# Patient Record
Sex: FEMALE | Race: WHITE | Employment: OTHER | URBAN - METROPOLITAN AREA
[De-identification: names, ages, dates, MRNs, and addresses within clinical notes are randomized per-mention and may not be internally consistent; named-entity substitution may affect disease eponyms.]

---

## 2018-05-10 ENCOUNTER — OFFICE VISIT (OUTPATIENT)
Dept: FAMILY MEDICINE CLINIC | Age: 43
End: 2018-05-10
Payer: MEDICAID

## 2018-05-10 ENCOUNTER — HOSPITAL ENCOUNTER (OUTPATIENT)
Dept: GENERAL RADIOLOGY | Age: 43
Discharge: HOME | End: 2018-05-12
Payer: MEDICAID

## 2018-05-10 ENCOUNTER — TELEPHONE (OUTPATIENT)
Dept: FAMILY MEDICINE CLINIC | Age: 43
End: 2018-05-10

## 2018-05-10 ENCOUNTER — HOSPITAL ENCOUNTER (OUTPATIENT)
Age: 43
Discharge: HOME | End: 2018-05-12
Payer: MEDICAID

## 2018-05-10 VITALS
TEMPERATURE: 98.3 F | DIASTOLIC BLOOD PRESSURE: 76 MMHG | HEART RATE: 79 BPM | BODY MASS INDEX: 28 KG/M2 | RESPIRATION RATE: 16 BRPM | WEIGHT: 164 LBS | SYSTOLIC BLOOD PRESSURE: 108 MMHG | HEIGHT: 64 IN | OXYGEN SATURATION: 98 %

## 2018-05-10 DIAGNOSIS — R53.83 FATIGUE, UNSPECIFIED TYPE: Primary | ICD-10-CM

## 2018-05-10 DIAGNOSIS — E03.9 ACQUIRED HYPOTHYROIDISM: ICD-10-CM

## 2018-05-10 DIAGNOSIS — E55.9 VITAMIN D DEFICIENCY: ICD-10-CM

## 2018-05-10 DIAGNOSIS — L70.0 ACNE VULGARIS: ICD-10-CM

## 2018-05-10 DIAGNOSIS — M79.2 NEURALGIA: ICD-10-CM

## 2018-05-10 DIAGNOSIS — Z12.31 SCREENING MAMMOGRAM, ENCOUNTER FOR: ICD-10-CM

## 2018-05-10 DIAGNOSIS — E78.5 DYSLIPIDEMIA: ICD-10-CM

## 2018-05-10 DIAGNOSIS — F41.9 ANXIETY: ICD-10-CM

## 2018-05-10 DIAGNOSIS — R53.83 FATIGUE, UNSPECIFIED TYPE: ICD-10-CM

## 2018-05-10 DIAGNOSIS — Z72.89 OTHER PROBLEMS RELATED TO LIFESTYLE: ICD-10-CM

## 2018-05-10 LAB
BASOPHILS ABSOLUTE: 0.05 E9/L (ref 0–0.2)
BASOPHILS RELATIVE PERCENT: 0.7 % (ref 0–2)
EOSINOPHILS ABSOLUTE: 0.13 E9/L (ref 0.05–0.5)
EOSINOPHILS RELATIVE PERCENT: 1.7 % (ref 0–6)
HCT VFR BLD CALC: 38.7 % (ref 34–48)
HEMOGLOBIN: 13 G/DL (ref 11.5–15.5)
IMMATURE GRANULOCYTES #: 0.02 E9/L
IMMATURE GRANULOCYTES %: 0.3 % (ref 0–5)
LYMPHOCYTES ABSOLUTE: 1.9 E9/L (ref 1.5–4)
LYMPHOCYTES RELATIVE PERCENT: 25.6 % (ref 20–42)
MCH RBC QN AUTO: 33.2 PG (ref 26–35)
MCHC RBC AUTO-ENTMCNC: 33.6 % (ref 32–34.5)
MCV RBC AUTO: 99 FL (ref 80–99.9)
MONOCYTES ABSOLUTE: 0.57 E9/L (ref 0.1–0.95)
MONOCYTES RELATIVE PERCENT: 7.7 % (ref 2–12)
NEUTROPHILS ABSOLUTE: 4.76 E9/L (ref 1.8–7.3)
NEUTROPHILS RELATIVE PERCENT: 64 % (ref 43–80)
PDW BLD-RTO: 12.7 FL (ref 11.5–15)
PLATELET # BLD: 197 E9/L (ref 130–450)
PMV BLD AUTO: 11.1 FL (ref 7–12)
RBC # BLD: 3.91 E12/L (ref 3.5–5.5)
WBC # BLD: 7.4 E9/L (ref 4.5–11.5)

## 2018-05-10 PROCEDURE — 82306 VITAMIN D 25 HYDROXY: CPT

## 2018-05-10 PROCEDURE — 99214 OFFICE O/P EST MOD 30 MIN: CPT | Performed by: FAMILY MEDICINE

## 2018-05-10 PROCEDURE — 80061 LIPID PANEL: CPT

## 2018-05-10 PROCEDURE — 86703 HIV-1/HIV-2 1 RESULT ANTBDY: CPT

## 2018-05-10 PROCEDURE — 77063 BREAST TOMOSYNTHESIS BI: CPT

## 2018-05-10 PROCEDURE — 1036F TOBACCO NON-USER: CPT | Performed by: FAMILY MEDICINE

## 2018-05-10 PROCEDURE — 85025 COMPLETE CBC W/AUTO DIFF WBC: CPT

## 2018-05-10 PROCEDURE — G8417 CALC BMI ABV UP PARAM F/U: HCPCS | Performed by: FAMILY MEDICINE

## 2018-05-10 PROCEDURE — G8427 DOCREV CUR MEDS BY ELIG CLIN: HCPCS | Performed by: FAMILY MEDICINE

## 2018-05-10 PROCEDURE — 84443 ASSAY THYROID STIM HORMONE: CPT

## 2018-05-10 PROCEDURE — 80053 COMPREHEN METABOLIC PANEL: CPT

## 2018-05-10 RX ORDER — CLINDAMYCIN PHOSPHATE, BENZOYL PEROXIDE 25; 10 MG/G; MG/G
GEL TOPICAL
Qty: 30 G | Refills: 0 | Status: SHIPPED | OUTPATIENT
Start: 2018-05-10 | End: 2018-05-10 | Stop reason: CLARIF

## 2018-05-10 RX ORDER — CLINDAMYCIN AND BENZOYL PEROXIDE 10; 50 MG/G; MG/G
GEL TOPICAL
Qty: 30 G | Refills: 0 | Status: SHIPPED
Start: 2018-05-10 | End: 2020-07-05

## 2018-05-10 ASSESSMENT — ENCOUNTER SYMPTOMS
COUGH: 0
SORE THROAT: 0
SINUS PRESSURE: 0
DIARRHEA: 0
SHORTNESS OF BREATH: 0
EYE PAIN: 0
ABDOMINAL PAIN: 0
BACK PAIN: 0
CONSTIPATION: 0

## 2018-05-10 ASSESSMENT — PATIENT HEALTH QUESTIONNAIRE - PHQ9
2. FEELING DOWN, DEPRESSED OR HOPELESS: 0
SUM OF ALL RESPONSES TO PHQ QUESTIONS 1-9: 1
SUM OF ALL RESPONSES TO PHQ9 QUESTIONS 1 & 2: 1
1. LITTLE INTEREST OR PLEASURE IN DOING THINGS: 1

## 2018-05-11 LAB
ALBUMIN SERPL-MCNC: 4.2 G/DL (ref 3.5–5.2)
ALP BLD-CCNC: 42 U/L (ref 35–104)
ALT SERPL-CCNC: 13 U/L (ref 0–32)
ANION GAP SERPL CALCULATED.3IONS-SCNC: 13 MMOL/L (ref 7–16)
AST SERPL-CCNC: 19 U/L (ref 0–31)
BILIRUB SERPL-MCNC: 0.6 MG/DL (ref 0–1.2)
BUN BLDV-MCNC: 14 MG/DL (ref 6–20)
CALCIUM SERPL-MCNC: 9 MG/DL (ref 8.6–10.2)
CHLORIDE BLD-SCNC: 105 MMOL/L (ref 98–107)
CHOLESTEROL, TOTAL: 175 MG/DL (ref 0–199)
CO2: 22 MMOL/L (ref 22–29)
CREAT SERPL-MCNC: 0.7 MG/DL (ref 0.5–1)
GFR AFRICAN AMERICAN: >60
GFR NON-AFRICAN AMERICAN: >60 ML/MIN/1.73
GLUCOSE BLD-MCNC: 85 MG/DL (ref 74–109)
HDLC SERPL-MCNC: 55 MG/DL
HIV-1 AND HIV-2 ANTIBODIES: NORMAL
LDL CHOLESTEROL CALCULATED: 99 MG/DL (ref 0–99)
POTASSIUM SERPL-SCNC: 4.3 MMOL/L (ref 3.5–5)
SODIUM BLD-SCNC: 140 MMOL/L (ref 132–146)
TOTAL PROTEIN: 6.9 G/DL (ref 6.4–8.3)
TRIGL SERPL-MCNC: 103 MG/DL (ref 0–149)
TSH SERPL DL<=0.05 MIU/L-ACNC: 1.25 UIU/ML (ref 0.27–4.2)
VITAMIN D 25-HYDROXY: 30 NG/ML (ref 30–100)
VLDLC SERPL CALC-MCNC: 21 MG/DL

## 2018-05-16 ENCOUNTER — TELEPHONE (OUTPATIENT)
Dept: FAMILY MEDICINE CLINIC | Age: 43
End: 2018-05-16

## 2018-05-24 ENCOUNTER — PATIENT MESSAGE (OUTPATIENT)
Dept: FAMILY MEDICINE CLINIC | Age: 43
End: 2018-05-24

## 2018-05-29 ENCOUNTER — TELEPHONE (OUTPATIENT)
Dept: FAMILY MEDICINE CLINIC | Age: 43
End: 2018-05-29

## 2018-05-29 RX ORDER — GABAPENTIN 300 MG/1
600 CAPSULE ORAL 2 TIMES DAILY
Qty: 120 CAPSULE | Refills: 3 | Status: SHIPPED | OUTPATIENT
Start: 2018-05-29 | End: 2019-03-04

## 2018-08-02 ENCOUNTER — PATIENT MESSAGE (OUTPATIENT)
Dept: FAMILY MEDICINE CLINIC | Age: 43
End: 2018-08-02

## 2018-08-03 RX ORDER — FLUCONAZOLE 150 MG/1
150 TABLET ORAL ONCE
Qty: 2 TABLET | Refills: 0 | Status: SHIPPED | OUTPATIENT
Start: 2018-08-03 | End: 2018-08-03

## 2018-08-09 ENCOUNTER — HOSPITAL ENCOUNTER (OUTPATIENT)
Dept: SLEEP CENTER | Age: 43
Discharge: HOME | End: 2018-08-09
Payer: MEDICAID

## 2018-08-09 VITALS
DIASTOLIC BLOOD PRESSURE: 68 MMHG | RESPIRATION RATE: 16 BRPM | BODY MASS INDEX: 27.31 KG/M2 | WEIGHT: 160 LBS | SYSTOLIC BLOOD PRESSURE: 104 MMHG | HEART RATE: 61 BPM | OXYGEN SATURATION: 98 % | HEIGHT: 64 IN

## 2018-08-09 PROCEDURE — 95810 POLYSOM 6/> YRS 4/> PARAM: CPT

## 2018-08-09 ASSESSMENT — SLEEP AND FATIGUE QUESTIONNAIRES
HOW LIKELY ARE YOU TO NOD OFF OR FALL ASLEEP IN A CAR, WHILE STOPPED FOR A FEW MINUTES IN TRAFFIC: 0
HOW LIKELY ARE YOU TO NOD OFF OR FALL ASLEEP WHILE WATCHING TV: 1
HOW LIKELY ARE YOU TO NOD OFF OR FALL ASLEEP WHILE SITTING QUIETLY AFTER LUNCH WITHOUT ALCOHOL: 3
HOW LIKELY ARE YOU TO NOD OFF OR FALL ASLEEP WHILE LYING DOWN TO REST IN THE AFTERNOON WHEN CIRCUMSTANCES PERMIT: 3
HOW LIKELY ARE YOU TO NOD OFF OR FALL ASLEEP WHILE SITTING AND READING: 2
HOW LIKELY ARE YOU TO NOD OFF OR FALL ASLEEP WHEN YOU ARE A PASSENGER IN A CAR FOR AN HOUR WITHOUT A BREAK: 3
HOW LIKELY ARE YOU TO NOD OFF OR FALL ASLEEP WHILE SITTING INACTIVE IN A PUBLIC PLACE: 1
ESS TOTAL SCORE: 15
HOW LIKELY ARE YOU TO NOD OFF OR FALL ASLEEP WHILE SITTING AND TALKING TO SOMEONE: 2

## 2018-08-12 ENCOUNTER — PATIENT MESSAGE (OUTPATIENT)
Dept: FAMILY MEDICINE CLINIC | Age: 43
End: 2018-08-12

## 2018-08-12 DIAGNOSIS — E03.9 ACQUIRED HYPOTHYROIDISM: ICD-10-CM

## 2018-08-13 NOTE — PROGRESS NOTES
[address] [address]                                SLEEP STUDY REPORT    PATIENT NAME: Karin Bailey                     :        1975  MED REC NO:   [id number]                            ROOM:  ACCOUNT NO:   [de-identified]                           ADMIT DATE: 2018  PROVIDER:     Hussein Mendoza MD    DATE OF STUDY:  2018    POLYSOMNOGRAPHY    A patient of Dr. Elsie Blanco. INDICATION FOR POLYSOMNOGRAPHY:  Snoring, restless sleep, morning  headaches, trouble with concentration, frequent waking to urinate. CURRENT MEDICATIONS:  Paroxetine, levothyroxine. INTERPRETATION/FINDINGS:  SLEEP SUMMARY:  This patient had a total time in bed of 376 minutes. Total  sleep time was 364 minutes. Sleep efficiency was 97%. Sleep latency was  less than 10 minutes. REM latency was 156 minutes. During sleep, there  were 29 arousals and 4 awakenings. The sleep disruption index was normal.    SLEEP STAGING:  The patient was awake less than 1% of the sleep time. Stage N1 was 6% and N2 was 67% of the sleep time. Slow-wave sleep was 9%  of the sleep time and stage REM sleep was 18% of the sleep time. RESPIRATION SUMMARY:  APNEA:  There were no apneic events. HYPOPNEA:  There were 20 hypopneic events. Eleven occurred during REM  stage sleep and two occurred in the non-supine position. Maximum duration  of a hypopneic event was 50 seconds and lowest oxygen saturation was 84%. APNEA/HYPOPNEA INDEX:  The apnea/hypopnea index is 3. OXYGEN SATURATION:  Baseline oxygen saturation was 93%. Lowest saturation  was 84%. The patient spent less than 2% of the time with saturation less  than 90%. EKG SUMMARY:  Baseline heart rate was 73 beats per minute. Average heart  rate was 78 beats per minute. Lowest heart rate was 60 beats per minute. There were no ectopic beats.     MISCELLANEOUS:  [place] Sleepiness Scale score was 18/24. Snoring was  moderate. This was graded as 5 on a 1 to 10 scale. There was occasional  bruxism. Leg jerks occurred independent of respiratory events. The limb  movement index was normal.    IMPRESSION:  1. No evidence of sleep apnea syndrome. 2.  Hypersomnolence indicated by a high sleep efficiency and a short sleep  latency. 3.  Moderate snoring. 4.  No cardiac dysrhythmia. 5.  Good oxygen saturation. DISCUSSION:  This patient has an apnea/hypopnea index of 3. Normal is less  than 5, leaving this patient in the normal category. Along with this,  there was good oxygen saturation and no cardiac dysrhythmia. There was  moderate snoring, however. Based on the results of this study, the patient  does not require treatment for sleep apnea syndrome. However, it should be  noted that one of the patient's complaints is vivid dreams and there is  certainly hypersomnolence noted by an increased sleep efficiency and a  short sleep latency. Also hypersomnolence is suggested by the patient's very high  [place] [address] Sleepiness Scale score. Therefore, the patient will be seen in the  office to discuss the results of this study and determine if further  evaluation/treatment is indicated. PLAN:  1. No treatment at this time. 2.  The patient to be seen in the office to discuss results of this study  and determine if further evaluation/treatment is indicated.         Opal Liriano MD  Diplomat of Sleep Medicine    D: 08/12/2018 12:16:32       T: 08/12/2018 12:18:36     [email or web address]  Job#: [id number]     Doc#: [id number]    CC:

## 2018-08-14 RX ORDER — LEVOTHYROXINE SODIUM 0.05 MG/1
TABLET ORAL
Qty: 90 TABLET | Refills: 1 | Status: SHIPPED | OUTPATIENT
Start: 2018-08-14 | End: 2019-01-31 | Stop reason: SDUPTHER

## 2018-08-17 DIAGNOSIS — G47.30 SLEEP APNEA, UNSPECIFIED TYPE: ICD-10-CM

## 2018-09-07 ENCOUNTER — HOSPITAL ENCOUNTER (OUTPATIENT)
Age: 43
Discharge: HOME | End: 2018-09-09
Payer: MEDICAID

## 2018-09-07 ENCOUNTER — OFFICE VISIT (OUTPATIENT)
Dept: FAMILY MEDICINE CLINIC | Age: 43
End: 2018-09-07
Payer: MEDICAID

## 2018-09-07 VITALS
RESPIRATION RATE: 16 BRPM | SYSTOLIC BLOOD PRESSURE: 133 MMHG | TEMPERATURE: 98.4 F | HEIGHT: 64 IN | OXYGEN SATURATION: 98 % | BODY MASS INDEX: 29.53 KG/M2 | HEART RATE: 78 BPM | WEIGHT: 173 LBS | DIASTOLIC BLOOD PRESSURE: 89 MMHG

## 2018-09-07 DIAGNOSIS — J02.9 SORE THROAT: ICD-10-CM

## 2018-09-07 DIAGNOSIS — J03.90 TONSILLITIS: Primary | ICD-10-CM

## 2018-09-07 DIAGNOSIS — Z23 NEED FOR INFLUENZA VACCINATION: ICD-10-CM

## 2018-09-07 PROCEDURE — 90471 IMMUNIZATION ADMIN: CPT | Performed by: FAMILY MEDICINE

## 2018-09-07 PROCEDURE — G8427 DOCREV CUR MEDS BY ELIG CLIN: HCPCS | Performed by: FAMILY MEDICINE

## 2018-09-07 PROCEDURE — 90688 IIV4 VACCINE SPLT 0.5 ML IM: CPT | Performed by: FAMILY MEDICINE

## 2018-09-07 PROCEDURE — 87081 CULTURE SCREEN ONLY: CPT

## 2018-09-07 PROCEDURE — 1036F TOBACCO NON-USER: CPT | Performed by: FAMILY MEDICINE

## 2018-09-07 PROCEDURE — 99213 OFFICE O/P EST LOW 20 MIN: CPT | Performed by: FAMILY MEDICINE

## 2018-09-07 PROCEDURE — 87147 CULTURE TYPE IMMUNOLOGIC: CPT

## 2018-09-07 PROCEDURE — G8417 CALC BMI ABV UP PARAM F/U: HCPCS | Performed by: FAMILY MEDICINE

## 2018-09-07 RX ORDER — METHYLPREDNISOLONE 4 MG/1
TABLET ORAL
Qty: 1 KIT | Refills: 0 | Status: SHIPPED | OUTPATIENT
Start: 2018-09-07 | End: 2018-09-13

## 2018-09-07 RX ORDER — AMOXICILLIN AND CLAVULANATE POTASSIUM 875; 125 MG/1; MG/1
1 TABLET, FILM COATED ORAL 2 TIMES DAILY WITH MEALS
Qty: 20 TABLET | Refills: 0 | Status: SHIPPED | OUTPATIENT
Start: 2018-09-07 | End: 2018-09-17

## 2018-09-07 ASSESSMENT — ENCOUNTER SYMPTOMS
SHORTNESS OF BREATH: 0
SORE THROAT: 1
BACK PAIN: 0
ABDOMINAL PAIN: 0
EYE PAIN: 0
COUGH: 0
DIARRHEA: 0
CONSTIPATION: 0
SINUS PRESSURE: 0

## 2018-09-07 NOTE — PROGRESS NOTES
Chinmay De La Paz  : 1975    Chief Complaint:     Chief Complaint   Patient presents with    Pharyngitis    Immunizations     flu shot       HPI  Chinmay De La Paz 43 y.o. presents for   Chief Complaint   Patient presents with    Pharyngitis    Immunizations     flu shot     Patient complaint a sore throat for the past 2 weeks. She denies any sinus pressure pain, ear pressure pain. She has been taking Flonase with little relief. She states the sore throat is now more on the top of her mouth. She denies any fevers or chills, cough. She does admit to some postnasal drip. All questions were answered to patients satisfaction. Past Medical History:   Diagnosis Date    Acne     Anxiety     Dyslipidemia     History of tobacco use     Hypothyroidism     hx  no tx at present    Insomnia     Low back pain, episodic     Outbursts of anger        Past Surgical History:   Procedure Laterality Date     SECTION  2008     SECTION  2012    TUBAL LIGATION  2012       Social History     Social History    Marital status:      Spouse name: N/A    Number of children: N/A    Years of education: N/A     Social History Main Topics    Smoking status: Former Smoker     Packs/day: 0.25     Years: 5.00     Quit date: 2009    Smokeless tobacco: Never Used      Comment: I smoked for 12 years then quit, then smoked occasionally until I quit 2009    Alcohol use Yes     0 Standard drinks or equivalent per week      Comment: very rarely, 1 drink maybe one or twice a month    Drug use: No    Sexual activity: Yes     Partners: Male     Other Topics Concern    None     Social History Narrative    None       No family history on file. Current Outpatient Prescriptions   Medication Sig Dispense Refill    methylPREDNISolone (MEDROL DOSEPACK) 4 MG tablet Take by mouth.  1 kit 0    amoxicillin-clavulanate (AUGMENTIN) 875-125 MG per tablet Take 1 tablet by mouth 2 times daily (with meals) for 10 days 20 tablet 0    levothyroxine (SYNTHROID) 50 MCG tablet TAKE 1 TABLET DAILY 90 tablet 1    gabapentin (NEURONTIN) 300 MG capsule Take 2 capsules by mouth 2 times daily for 31 days. . 120 capsule 3    clindamycin-benzoyl peroxide (BENZACLIN) 1-5 % gel Apply topically 2 times daily. 30 g 0    PARoxetine (PAXIL) 10 MG tablet Take 1 tablet by mouth every morning 90 tablet 1    Omega-3 Fatty Acids (FISH OIL) 1200 MG CAPS Take by mouth      GINSENG PO Take by mouth      Ginkgo Biloba (GINKOBA PO) Take by mouth      tretinoin (RETIN-A) 0.025 % cream       Multiple Vitamin (MULTI-VITAMIN PO) Take  by mouth. No current facility-administered medications for this visit. No Known Allergies    There are no preventive care reminders to display for this patient. REVIEW OF SYSTEMS  Review of Systems   Constitutional: Negative for fatigue and fever. HENT: Positive for postnasal drip and sore throat. Negative for ear pain, sinus pressure and sneezing. Eyes: Negative for pain. Respiratory: Negative for cough and shortness of breath. Cardiovascular: Negative for chest pain and leg swelling. Gastrointestinal: Negative for abdominal pain, constipation and diarrhea. Genitourinary: Negative for dysuria and urgency. Musculoskeletal: Negative for back pain and myalgias. Skin: Negative for rash. Allergic/Immunologic: Negative for food allergies. Neurological: Negative for light-headedness and headaches. Hematological: Does not bruise/bleed easily. Psychiatric/Behavioral: Negative for behavioral problems and sleep disturbance. PHYSICAL EXAM  /89 (Site: Right Upper Arm)   Pulse 78   Temp 98.4 °F (36.9 °C) (Tympanic)   Resp 16   Ht 5' 4\" (1.626 m)   Wt 173 lb (78.5 kg)   SpO2 98%   BMI 29.70 kg/m²   Physical Exam   Constitutional: She is oriented to person, place, and time. She appears well-nourished. No distress.    HENT:   Head:

## 2018-09-10 LAB — S PYO THROAT QL CULT: NORMAL

## 2018-09-18 ENCOUNTER — PATIENT MESSAGE (OUTPATIENT)
Dept: FAMILY MEDICINE CLINIC | Age: 43
End: 2018-09-18

## 2018-09-18 NOTE — TELEPHONE ENCOUNTER
From: Yoshi Anderson  To: Meg Avila DO  Sent: 9/18/2018 4:13 PM EDT  Subject: Prescription Question    I am in [place] for work and I have a yeast infection developing. I am leaving Thursday morning but Is it possible for you to call in a prescription to the local [facility] sooner? [address] [place] [address], [id number]. Thank you.

## 2018-09-19 RX ORDER — FLUCONAZOLE 150 MG/1
150 TABLET ORAL ONCE
Qty: 2 TABLET | Refills: 0 | Status: SHIPPED | OUTPATIENT
Start: 2018-09-19 | End: 2018-09-19

## 2018-09-22 ENCOUNTER — PATIENT MESSAGE (OUTPATIENT)
Dept: FAMILY MEDICINE CLINIC | Age: 43
End: 2018-09-22

## 2018-09-22 DIAGNOSIS — F41.9 ANXIETY: ICD-10-CM

## 2018-09-24 RX ORDER — PAROXETINE 10 MG/1
10 TABLET, FILM COATED ORAL EVERY MORNING
Qty: 90 TABLET | Refills: 1 | Status: SHIPPED | OUTPATIENT
Start: 2018-09-24 | End: 2019-02-22 | Stop reason: SDUPTHER

## 2019-01-31 DIAGNOSIS — E03.9 ACQUIRED HYPOTHYROIDISM: ICD-10-CM

## 2019-01-31 RX ORDER — LEVOTHYROXINE SODIUM 0.05 MG/1
TABLET ORAL
Qty: 90 TABLET | Refills: 1 | Status: SHIPPED | OUTPATIENT
Start: 2019-01-31 | End: 2019-07-21 | Stop reason: SDUPTHER

## 2019-02-06 ENCOUNTER — HOSPITAL ENCOUNTER (OUTPATIENT)
Dept: SLEEP CENTER | Age: 44
Discharge: HOME | End: 2019-02-06
Payer: MEDICAID

## 2019-02-06 DIAGNOSIS — G47.10 HYPERSOMNOLENCE: Primary | ICD-10-CM

## 2019-02-06 DIAGNOSIS — R68.89 VIVID DREAM: ICD-10-CM

## 2019-02-06 DIAGNOSIS — E66.9 CLASS 1 OBESITY WITH BODY MASS INDEX (BMI) OF 30.0 TO 30.9 IN ADULT, UNSPECIFIED OBESITY TYPE, UNSPECIFIED WHETHER SERIOUS COMORBIDITY PRESENT: ICD-10-CM

## 2019-02-06 DIAGNOSIS — F51.01 PRIMARY INSOMNIA: ICD-10-CM

## 2019-02-06 DIAGNOSIS — F41.9 ANXIETY: ICD-10-CM

## 2019-02-06 PROCEDURE — 95805 MULTIPLE SLEEP LATENCY TEST: CPT

## 2019-02-06 PROCEDURE — 95810 POLYSOM 6/> YRS 4/> PARAM: CPT

## 2019-02-06 ASSESSMENT — SLEEP AND FATIGUE QUESTIONNAIRES
HOW LIKELY ARE YOU TO NOD OFF OR FALL ASLEEP WHILE WATCHING TV: 2
ESS TOTAL SCORE: 14
HOW LIKELY ARE YOU TO NOD OFF OR FALL ASLEEP WHILE LYING DOWN TO REST IN THE AFTERNOON WHEN CIRCUMSTANCES PERMIT: 3
HOW LIKELY ARE YOU TO NOD OFF OR FALL ASLEEP WHEN YOU ARE A PASSENGER IN A CAR FOR AN HOUR WITHOUT A BREAK: 3
HOW LIKELY ARE YOU TO NOD OFF OR FALL ASLEEP WHILE SITTING INACTIVE IN A PUBLIC PLACE: 0
HOW LIKELY ARE YOU TO NOD OFF OR FALL ASLEEP IN A CAR, WHILE STOPPED FOR A FEW MINUTES IN TRAFFIC: 0
HOW LIKELY ARE YOU TO NOD OFF OR FALL ASLEEP WHILE SITTING QUIETLY AFTER LUNCH WITHOUT ALCOHOL: 3
HOW LIKELY ARE YOU TO NOD OFF OR FALL ASLEEP WHILE SITTING AND TALKING TO SOMEONE: 0
HOW LIKELY ARE YOU TO NOD OFF OR FALL ASLEEP WHILE SITTING AND READING: 3

## 2019-02-07 ENCOUNTER — PATIENT MESSAGE (OUTPATIENT)
Dept: FAMILY MEDICINE CLINIC | Age: 44
End: 2019-02-07

## 2019-02-07 VITALS
HEIGHT: 64 IN | OXYGEN SATURATION: 93 % | SYSTOLIC BLOOD PRESSURE: 116 MMHG | WEIGHT: 170 LBS | DIASTOLIC BLOOD PRESSURE: 69 MMHG | BODY MASS INDEX: 29.02 KG/M2 | HEART RATE: 69 BPM

## 2019-02-11 RX ORDER — QUETIAPINE FUMARATE 25 MG/1
25 TABLET, FILM COATED ORAL NIGHTLY
Qty: 30 TABLET | Refills: 3 | Status: SHIPPED | OUTPATIENT
Start: 2019-02-11 | End: 2019-02-22

## 2019-02-21 ENCOUNTER — PATIENT MESSAGE (OUTPATIENT)
Dept: FAMILY MEDICINE CLINIC | Age: 44
End: 2019-02-21

## 2019-02-22 ENCOUNTER — OFFICE VISIT (OUTPATIENT)
Dept: FAMILY MEDICINE CLINIC | Age: 44
End: 2019-02-22
Payer: MEDICAID

## 2019-02-22 VITALS
WEIGHT: 174 LBS | HEART RATE: 74 BPM | BODY MASS INDEX: 29.71 KG/M2 | HEIGHT: 64 IN | OXYGEN SATURATION: 99 % | SYSTOLIC BLOOD PRESSURE: 110 MMHG | DIASTOLIC BLOOD PRESSURE: 76 MMHG | RESPIRATION RATE: 14 BRPM

## 2019-02-22 DIAGNOSIS — F41.9 ANXIETY: Primary | ICD-10-CM

## 2019-02-22 DIAGNOSIS — E03.9 ACQUIRED HYPOTHYROIDISM: ICD-10-CM

## 2019-02-22 DIAGNOSIS — F51.01 PRIMARY INSOMNIA: ICD-10-CM

## 2019-02-22 PROCEDURE — 96160 PT-FOCUSED HLTH RISK ASSMT: CPT | Performed by: FAMILY MEDICINE

## 2019-02-22 PROCEDURE — G8427 DOCREV CUR MEDS BY ELIG CLIN: HCPCS | Performed by: FAMILY MEDICINE

## 2019-02-22 PROCEDURE — 1036F TOBACCO NON-USER: CPT | Performed by: FAMILY MEDICINE

## 2019-02-22 PROCEDURE — G8482 FLU IMMUNIZE ORDER/ADMIN: HCPCS | Performed by: FAMILY MEDICINE

## 2019-02-22 PROCEDURE — G8417 CALC BMI ABV UP PARAM F/U: HCPCS | Performed by: FAMILY MEDICINE

## 2019-02-22 PROCEDURE — 99214 OFFICE O/P EST MOD 30 MIN: CPT | Performed by: FAMILY MEDICINE

## 2019-02-22 RX ORDER — PAROXETINE HYDROCHLORIDE 20 MG/1
20 TABLET, FILM COATED ORAL EVERY MORNING
Qty: 30 TABLET | Refills: 2 | Status: SHIPPED | OUTPATIENT
Start: 2019-02-22 | End: 2019-04-22

## 2019-02-22 ASSESSMENT — PATIENT HEALTH QUESTIONNAIRE - PHQ9
4. FEELING TIRED OR HAVING LITTLE ENERGY: 1
5. POOR APPETITE OR OVEREATING: 0
9. THOUGHTS THAT YOU WOULD BE BETTER OFF DEAD, OR OF HURTING YOURSELF: 0
7. TROUBLE CONCENTRATING ON THINGS, SUCH AS READING THE NEWSPAPER OR WATCHING TELEVISION: 0
3. TROUBLE FALLING OR STAYING ASLEEP: 1
8. MOVING OR SPEAKING SO SLOWLY THAT OTHER PEOPLE COULD HAVE NOTICED. OR THE OPPOSITE, BEING SO FIGETY OR RESTLESS THAT YOU HAVE BEEN MOVING AROUND A LOT MORE THAN USUAL: 0
SUM OF ALL RESPONSES TO PHQ9 QUESTIONS 1 & 2: 6
10. IF YOU CHECKED OFF ANY PROBLEMS, HOW DIFFICULT HAVE THESE PROBLEMS MADE IT FOR YOU TO DO YOUR WORK, TAKE CARE OF THINGS AT HOME, OR GET ALONG WITH OTHER PEOPLE: 1
6. FEELING BAD ABOUT YOURSELF - OR THAT YOU ARE A FAILURE OR HAVE LET YOURSELF OR YOUR FAMILY DOWN: 0
1. LITTLE INTEREST OR PLEASURE IN DOING THINGS: 3
SUM OF ALL RESPONSES TO PHQ QUESTIONS 1-9: 8
2. FEELING DOWN, DEPRESSED OR HOPELESS: 3
SUM OF ALL RESPONSES TO PHQ QUESTIONS 1-9: 8

## 2019-02-22 ASSESSMENT — ENCOUNTER SYMPTOMS
DIARRHEA: 0
SHORTNESS OF BREATH: 0
SINUS PRESSURE: 0
ABDOMINAL PAIN: 0
SORE THROAT: 0
CONSTIPATION: 0
EYE PAIN: 0
COUGH: 0
BACK PAIN: 0

## 2019-03-08 DIAGNOSIS — F41.9 ANXIETY: ICD-10-CM

## 2019-03-08 RX ORDER — PAROXETINE 10 MG/1
10 TABLET, FILM COATED ORAL EVERY MORNING
Qty: 90 TABLET | Refills: 1 | Status: SHIPPED | OUTPATIENT
Start: 2019-03-08 | End: 2019-05-15

## 2019-03-13 ENCOUNTER — PATIENT MESSAGE (OUTPATIENT)
Dept: FAMILY MEDICINE CLINIC | Age: 44
End: 2019-03-13

## 2019-03-29 ENCOUNTER — TELEMEDICINE (OUTPATIENT)
Dept: FAMILY MEDICINE CLINIC | Age: 44
End: 2019-03-29
Payer: MEDICAID

## 2019-03-29 DIAGNOSIS — L71.9 ROSACEA, ACNE: ICD-10-CM

## 2019-03-29 DIAGNOSIS — E03.9 ACQUIRED HYPOTHYROIDISM: ICD-10-CM

## 2019-03-29 DIAGNOSIS — E55.9 VITAMIN D DEFICIENCY: ICD-10-CM

## 2019-03-29 DIAGNOSIS — F41.9 ANXIETY: Primary | ICD-10-CM

## 2019-03-29 PROCEDURE — 99214 OFFICE O/P EST MOD 30 MIN: CPT | Performed by: FAMILY MEDICINE

## 2019-03-29 PROCEDURE — G8428 CUR MEDS NOT DOCUMENT: HCPCS | Performed by: FAMILY MEDICINE

## 2019-03-29 RX ORDER — METRONIDAZOLE 7.5 MG/G
GEL TOPICAL
Qty: 1 TUBE | Refills: 0 | Status: SHIPPED | OUTPATIENT
Start: 2019-03-29 | End: 2019-06-26

## 2019-03-29 ASSESSMENT — ENCOUNTER SYMPTOMS
SINUS PRESSURE: 0
SORE THROAT: 0
COUGH: 0
SHORTNESS OF BREATH: 0
DIARRHEA: 0
ABDOMINAL PAIN: 0
EYE PAIN: 0
BACK PAIN: 0
CONSTIPATION: 0

## 2019-04-17 ENCOUNTER — NURSE ONLY (OUTPATIENT)
Dept: FAMILY MEDICINE CLINIC | Age: 44
End: 2019-04-17
Payer: MEDICAID

## 2019-04-17 ENCOUNTER — TELEPHONE (OUTPATIENT)
Dept: FAMILY MEDICINE CLINIC | Age: 44
End: 2019-04-17

## 2019-04-17 DIAGNOSIS — R30.9 PAIN WITH URINATION: Primary | ICD-10-CM

## 2019-04-17 LAB
BILIRUBIN, POC: NORMAL
BLOOD URINE, POC: NORMAL
CLARITY, POC: CLEAR
COLOR, POC: YELLOW
GLUCOSE URINE, POC: NORMAL
KETONES, POC: NORMAL
LEUKOCYTE EST, POC: NORMAL
NITRITE, POC: NORMAL
PH, POC: 6
PROTEIN, POC: NORMAL
SPECIFIC GRAVITY, POC: 1.01
UROBILINOGEN, POC: 0.2

## 2019-04-17 PROCEDURE — 81002 URINALYSIS NONAUTO W/O SCOPE: CPT | Performed by: FAMILY MEDICINE

## 2019-04-17 RX ORDER — FLUCONAZOLE 150 MG/1
150 TABLET ORAL ONCE
Qty: 2 TABLET | Refills: 0 | Status: SHIPPED | OUTPATIENT
Start: 2019-04-17 | End: 2019-04-17

## 2019-04-17 NOTE — TELEPHONE ENCOUNTER
Pt here today UA, has been having pain with urination over the last 2 days, she also thinks she has a yeast infection, asking for ax.       GLU: NEG  GABRIELLA: NEG  KET: NEG  S.010  BLO: NEG  PH: 6.0  PRO: NEG  URO: 0.2  NIT: NEG  JEN: NEG

## 2019-04-22 ENCOUNTER — OFFICE VISIT (OUTPATIENT)
Dept: FAMILY MEDICINE CLINIC | Age: 44
End: 2019-04-22
Payer: MEDICAID

## 2019-04-22 ENCOUNTER — HOSPITAL ENCOUNTER (OUTPATIENT)
Age: 44
Discharge: HOME | End: 2019-04-24
Payer: MEDICAID

## 2019-04-22 VITALS
RESPIRATION RATE: 16 BRPM | WEIGHT: 172 LBS | HEIGHT: 64 IN | DIASTOLIC BLOOD PRESSURE: 70 MMHG | SYSTOLIC BLOOD PRESSURE: 112 MMHG | OXYGEN SATURATION: 98 % | HEART RATE: 79 BPM | TEMPERATURE: 98.9 F | BODY MASS INDEX: 29.37 KG/M2

## 2019-04-22 DIAGNOSIS — N30.91 CYSTITIS WITH HEMATURIA: ICD-10-CM

## 2019-04-22 DIAGNOSIS — R30.0 DYSURIA: ICD-10-CM

## 2019-04-22 DIAGNOSIS — N89.8 VAGINAL DISCHARGE: Primary | ICD-10-CM

## 2019-04-22 DIAGNOSIS — N89.8 VAGINAL DISCHARGE: ICD-10-CM

## 2019-04-22 LAB
BILIRUBIN, POC: NEGATIVE
BLOOD URINE, POC: NORMAL
CLARITY, POC: NORMAL
COLOR, POC: YELLOW
GLUCOSE URINE, POC: NEGATIVE
KETONES, POC: NEGATIVE
LEUKOCYTE EST, POC: NORMAL
NITRITE, POC: NEGATIVE
PH, POC: 6
PROTEIN, POC: 30
SPECIFIC GRAVITY, POC: >=1.03
UROBILINOGEN, POC: 0.2

## 2019-04-22 PROCEDURE — 87088 URINE BACTERIA CULTURE: CPT

## 2019-04-22 PROCEDURE — G8427 DOCREV CUR MEDS BY ELIG CLIN: HCPCS | Performed by: NURSE PRACTITIONER

## 2019-04-22 PROCEDURE — G8417 CALC BMI ABV UP PARAM F/U: HCPCS | Performed by: NURSE PRACTITIONER

## 2019-04-22 PROCEDURE — 99213 OFFICE O/P EST LOW 20 MIN: CPT | Performed by: NURSE PRACTITIONER

## 2019-04-22 PROCEDURE — 1036F TOBACCO NON-USER: CPT | Performed by: NURSE PRACTITIONER

## 2019-04-22 PROCEDURE — 81002 URINALYSIS NONAUTO W/O SCOPE: CPT | Performed by: NURSE PRACTITIONER

## 2019-04-22 PROCEDURE — 87070 CULTURE OTHR SPECIMN AEROBIC: CPT

## 2019-04-22 RX ORDER — CIPROFLOXACIN 500 MG/1
500 TABLET, FILM COATED ORAL 2 TIMES DAILY
Qty: 14 TABLET | Refills: 0 | Status: SHIPPED | OUTPATIENT
Start: 2019-04-22 | End: 2019-04-29

## 2019-04-22 ASSESSMENT — ENCOUNTER SYMPTOMS
COLOR CHANGE: 0
NAUSEA: 0
EYE ITCHING: 0
EYE PAIN: 0
CONSTIPATION: 0
STRIDOR: 0
ABDOMINAL PAIN: 0
SHORTNESS OF BREATH: 0
ABDOMINAL DISTENTION: 0
EYE DISCHARGE: 0
ANAL BLEEDING: 0
COUGH: 0
EYE REDNESS: 0
BLOOD IN STOOL: 0
VOMITING: 0
RECTAL PAIN: 0
WHEEZING: 0
PHOTOPHOBIA: 0
DIARRHEA: 0

## 2019-04-22 NOTE — PROGRESS NOTES
Concepcion Parham is a 37 y.o. female who presents today for   Chief Complaint   Patient presents with    Dysuria    Vaginal Discharge         HPI      Dysuria/Vaginal Discharge  Patient complains of dysuria, suprapubic pressure, vaginal discharge She has had symptoms for several days. Patient also complains of no other issues. . Patient denies back pain and fever. Patient does not have a history of recurrent UTI. Patient does not have a history of pyelonephritis. Pt admits she did have intercourse with a different partner, pt stated discharge did have a \"fishy-like\" odor but is white, Pt did have unprotected intercourse    [address]:  Patient's past medical, surgical, social and/or family history reviewed, updated in chart, and are non-contributory (unless otherwise stated). Medications and allergies also reviewed and updated in chart. Review of Systems  Review of Systems   Constitutional: Negative for activity change, appetite change, chills, diaphoresis, fatigue and fever. Eyes: Negative for photophobia, pain, discharge, redness, itching and visual disturbance. Respiratory: Negative for cough, shortness of breath, wheezing and stridor. Cardiovascular: Negative for chest pain, palpitations and leg swelling. Gastrointestinal: Negative for abdominal distention, abdominal pain, anal bleeding, blood in stool, constipation, diarrhea, nausea, rectal pain and vomiting. Genitourinary: Positive for dysuria and vaginal discharge. Negative for decreased urine volume, difficulty urinating, dyspareunia, enuresis, flank pain, frequency, genital sores, hematuria, pelvic pain, urgency, vaginal bleeding and vaginal pain. Skin: Negative for color change, pallor and rash.        Physical Exam:    VS:  /70 (Site: Right Upper Arm, Position: Sitting, Cuff Size: Medium Adult)   Pulse 79   Temp 98.9 °F (37.2 °C) (Oral)   Resp 16   Ht 5' 4\" (1.626 m)   Wt 172 lb (78 kg)   LMP 04/10/2019 (Exact Date) SpO2 98%   BMI 29.52 kg/m²   LAST WEIGHT:  Wt Readings from Last 3 Encounters:   04/22/19 172 lb (78 kg)   03/04/19 177 lb 12.8 oz (80.6 kg)   02/22/19 174 lb (78.9 kg)     Physical Exam   Constitutional: She appears well-developed and well-nourished. No distress. Eyes: Pupils are equal, round, and reactive to light. Conjunctivae and EOM are normal. Right eye exhibits no discharge. Left eye exhibits no discharge. Neck: Normal range of motion. Neck supple. Cardiovascular: Normal rate, regular rhythm, normal heart sounds and intact distal pulses. Pulmonary/Chest: Effort normal and breath sounds normal. No stridor. No respiratory distress. She has no wheezes. She has no rales. She exhibits no tenderness. Abdominal: Soft. Bowel sounds are normal. She exhibits no distension and no mass. There is tenderness (mild suprapubic tenderness, no CVA tenderness). There is no rebound and no guarding. No hernia. Genitourinary: There is no rash, tenderness or lesion on the right labia. There is no rash, tenderness or lesion on the left labia. Cervix exhibits discharge (thick white, no odor). Cervix exhibits no motion tenderness and no friability. No erythema, tenderness or bleeding in the vagina. No vaginal discharge found. Genitourinary Comments: Vaginal Culture obtained   Lymphadenopathy:     She has no cervical adenopathy. No inguinal adenopathy noted on the right or left side. Skin: She is not diaphoretic. Nursing note and vitals reviewed. Assessment / Plan:      Padmaja Somers was seen today for dysuria and vaginal discharge. Diagnoses and all orders for this visit:    Vaginal discharge  -     GENITAL CULTURE; Future  -     Urine Culture; Future    Cystitis with hematuria  Advised to start Cipro today  Advised to increase water intake  Advised not to hold urine  -     Urine Culture; Future  -     ciprofloxacin (CIPRO) 500 MG tablet;  Take 1 tablet by mouth 2 times daily for 7 days    Dysuria  -     POCT Urinalysis no Micro-POSITIVE LEUKOCYTES AND BLOOD  -     Urine Culture; Future  -     ciprofloxacin (CIPRO) 500 MG tablet; Take 1 tablet by mouth 2 times daily for 7 days         Call or go to ED immediately if symptoms worsen or persist.    Return if symptoms worsen or fail to improve. , or sooner if necessary. Educational materials and/or home exercises printed for patient's review and were included in patient instructions on his/her After Visit Summary and given to patient at the end of visit. Counseled regarding above diagnosis, including possible risks and complications,  especially if left uncontrolled. Counseled regarding the possible side effects, risks, benefits and alternatives to treatment; patient and/or guardian verbalizes understanding, agrees, feels comfortable with and wishes to proceed with above treatment plan. Advised patient to call with any new medication issues, and read all Rx info from pharmacy to assure aware of all possible risks and side effects of medication before taking. Reviewed age and gender appropriate health screening exams and vaccinations. Advised patient regarding importance of keeping up with recommended health maintenance and to schedule as soon as possible if overdue, as this is important in assessing for undiagnosed pathology, especially cancer, as well as protecting against potentially harmful/life threatening disease. Patient and/or guardian verbalizes understanding and agrees with above counseling, assessment and plan. All questions answered.     Jenn Case, APRN - CNP

## 2019-04-24 LAB — URINE CULTURE, ROUTINE: NORMAL

## 2019-04-25 LAB
GENITAL CULTURE, ROUTINE: ABNORMAL
GENITAL CULTURE, ROUTINE: ABNORMAL
ORGANISM: ABNORMAL

## 2019-04-25 RX ORDER — METRONIDAZOLE 500 MG/1
TABLET ORAL
Qty: 14 TABLET | Refills: 0 | Status: SHIPPED | OUTPATIENT
Start: 2019-04-25 | End: 2019-06-26

## 2019-04-30 ENCOUNTER — HOSPITAL ENCOUNTER (OUTPATIENT)
Dept: GENERAL RADIOLOGY | Age: 44
Discharge: HOME | End: 2019-05-02
Payer: MEDICAID

## 2019-04-30 ENCOUNTER — HOSPITAL ENCOUNTER (OUTPATIENT)
Age: 44
Discharge: HOME | End: 2019-05-02
Payer: MEDICAID

## 2019-04-30 ENCOUNTER — OFFICE VISIT (OUTPATIENT)
Dept: FAMILY MEDICINE CLINIC | Age: 44
End: 2019-04-30
Payer: MEDICAID

## 2019-04-30 VITALS
HEART RATE: 81 BPM | DIASTOLIC BLOOD PRESSURE: 87 MMHG | HEIGHT: 64 IN | RESPIRATION RATE: 16 BRPM | SYSTOLIC BLOOD PRESSURE: 120 MMHG | BODY MASS INDEX: 29.02 KG/M2 | WEIGHT: 170 LBS | OXYGEN SATURATION: 99 %

## 2019-04-30 DIAGNOSIS — R10.2 PAIN IN FEMALE PELVIS: ICD-10-CM

## 2019-04-30 DIAGNOSIS — R33.9 URINARY RETENTION: Primary | ICD-10-CM

## 2019-04-30 DIAGNOSIS — K59.00 CONSTIPATION, UNSPECIFIED CONSTIPATION TYPE: ICD-10-CM

## 2019-04-30 LAB
BILIRUBIN, POC: NORMAL
BLOOD URINE, POC: NORMAL
CLARITY, POC: NORMAL
COLOR, POC: YELLOW
GLUCOSE URINE, POC: NORMAL
KETONES, POC: NORMAL
LEUKOCYTE EST, POC: NORMAL
NITRITE, POC: NORMAL
PH, POC: 5.5
PROTEIN, POC: NORMAL
SPECIFIC GRAVITY, POC: 1.02
UROBILINOGEN, POC: 0.2

## 2019-04-30 PROCEDURE — G8417 CALC BMI ABV UP PARAM F/U: HCPCS | Performed by: FAMILY MEDICINE

## 2019-04-30 PROCEDURE — 74018 RADEX ABDOMEN 1 VIEW: CPT

## 2019-04-30 PROCEDURE — G8427 DOCREV CUR MEDS BY ELIG CLIN: HCPCS | Performed by: FAMILY MEDICINE

## 2019-04-30 PROCEDURE — 99213 OFFICE O/P EST LOW 20 MIN: CPT | Performed by: FAMILY MEDICINE

## 2019-04-30 PROCEDURE — 1036F TOBACCO NON-USER: CPT | Performed by: FAMILY MEDICINE

## 2019-04-30 PROCEDURE — 81002 URINALYSIS NONAUTO W/O SCOPE: CPT | Performed by: FAMILY MEDICINE

## 2019-04-30 NOTE — PROGRESS NOTES
drink maybe one or twice a month    Drug use: No    Sexual activity: Yes     Partners: Male   Lifestyle    Physical activity:     Days per week: None     Minutes per session: None    Stress: None   Relationships    Social connections:     Talks on phone: None     Gets together: None     Attends [employer] service: None     Active member of club or organization: None     Attends meetings of clubs or organizations: None     Relationship status: None    Intimate partner violence:     Fear of current or ex partner: None     Emotionally abused: None     Physically abused: None     Forced sexual activity: None   Other Topics Concern    None   Social History Narrative    None       History reviewed. No pertinent family history. Current Outpatient Medications   Medication Sig Dispense Refill    metroNIDAZOLE (FLAGYL) 500 MG tablet 1 tab po BID x 7 days 14 tablet 0    metroNIDAZOLE (METROGEL) 0.75 % gel Apply topically 2 times daily. 1 Tube 0    Crisaborole 2 % OINT Apply small amount on affected areas prn 1 Tube 0    PARoxetine (PAXIL) 10 MG tablet take 1 tablet by mouth every morning 90 tablet 1    gabapentin (NEURONTIN) 300 MG capsule Take 300 mg by mouth 2 times daily.  levothyroxine (SYNTHROID) 50 MCG tablet take 1 tablet by mouth once daily 90 tablet 1    clindamycin-benzoyl peroxide (BENZACLIN) 1-5 % gel Apply topically 2 times daily. 30 g 0    Omega-3 Fatty Acids (FISH OIL) 1200 MG CAPS Take by mouth      GINSENG PO Take by mouth      Ginkgo Biloba (GINKOBA PO) Take by mouth      tretinoin (RETIN-A) 0.025 % cream       Multiple Vitamin (MULTI-VITAMIN PO) Take  by mouth. No current facility-administered medications for this visit.         No Known Allergies    Health Maintenance Due   Topic Date Due    Cervical cancer screen  03/08/2019    TSH testing  05/10/2019           REVIEW OF SYSTEMS  Review of Systems   Constitutional: Negative for activity change, appetite change, chills, Abdominal: Soft. Bowel sounds are normal. There is no tenderness. There is no guarding. Musculoskeletal: Normal range of motion. She exhibits no edema. Neurological: She is alert and oriented to person, place, and time. No cranial nerve deficit. Skin: Skin is warm and dry. No rash noted. Psychiatric: She has a normal mood and affect. Her behavior is normal.   Nursing note and vitals reviewed. Laboratory: All laboratory and radiology results have been personally reviewed by myself    Lab Results   Component Value Date     05/10/2018    K 4.3 05/10/2018     05/10/2018    CO2 22 05/10/2018    BUN 14 05/10/2018    CREATININE 0.7 05/10/2018    PROT 6.9 05/10/2018    LABALBU 4.2 05/10/2018    CALCIUM 9.0 05/10/2018    GFRAA >60 05/10/2018    LABGLOM >60 05/10/2018    GLUCOSE 85 05/10/2018    AST 19 05/10/2018    ALT 13 05/10/2018    ALKPHOS 42 05/10/2018    BILITOT 0.6 05/10/2018    TSH 1.250 05/10/2018    VITD25 30 05/10/2018    CHOL 175 05/10/2018    TRIG 103 05/10/2018    HDL 55 05/10/2018    LDLCALC 99 05/10/2018        Lab Results   Component Value Date    CHOL 175 05/10/2018    CHOL 171 10/04/2013     Lab Results   Component Value Date    TRIG 103 05/10/2018    TRIG 83 10/04/2013     Lab Results   Component Value Date    HDL 55 05/10/2018    HDL 54.0 10/04/2013     Lab Results   Component Value Date    LDLCALC 99 05/10/2018    LDLCALC 100 (H) 10/04/2013       No results found for: LABA1C  Lab Results   Component Value Date    LDLCALC 99 05/10/2018    CREATININE 0.7 05/10/2018       ASSESSMENT/PLAN:     Diagnosis Orders   1. Urinary retention  POCT Urinalysis no Micro    US URINARY BLADDER LIMITED   2. Pain in female pelvis  US NON OB TRANSVAGINAL   3. Constipation, unspecified constipation type  XR ABDOMEN (KUB) (SINGLE AP VIEW)     Did discuss that all of her symptoms may be related to constipation. Patient is hesitant about this. We'll obtain KUB to further evaluate.  If KUB is positive then we'll cancel ultrasounds. Urinalysis today was negative. Problem list reviewed andsimplified/updated  HM reviewed today and counseled as appropriate    Call or go to ED immediately if symptoms worsen or persist.  Future Appointments   Date Time Provider Minerva Falcon   5/15/2019 11:00 AM DO Cj [facility]     Or sooner if necessary. Educational materials and/or homeexercises printed for patient's review and were included in patient instructions on his/her After Visit Summary and given to patient at the end of visit.       Counseled regarding above diagnosis, including possible risks and complications,  especially if left uncontrolled.     Counseled regarding the possible side effects, risks, benefits and alternatives to treatment; patient and/or guardian verbalizes understanding, agrees,feels comfortable with and wishes to proceed with above treatment plan.     Advised patient to call Kristen Owen new medication issues, and read all Rx info from pharmacy to assure aware of all possible risks and side effects of medication before taking.     Reviewed age and gender appropriate health screening exams and vaccinations. Advised patient regarding importance of keeping up with recommended health maintenance and toschedule as soon as possible if overdue, as this is important in assessing for undiagnosed pathology, especially cancer, as well as protecting against potentially harmful/life threatening disease.          Patient and/or guardian verbalizes understanding and agrees with above counseling, assessment and plan.     All questions answered. Kip Shay DO  4/30/19    NOTE: This report was transcribed using voice recognition software.  Every effort was made to ensure accuracy; however, inadvertent computerized transcription errors may be present

## 2019-05-01 ASSESSMENT — ENCOUNTER SYMPTOMS
NAUSEA: 0
ABDOMINAL PAIN: 0
COLOR CHANGE: 0
DIARRHEA: 0
PHOTOPHOBIA: 0
STRIDOR: 0
COUGH: 0
EYE ITCHING: 0
SHORTNESS OF BREATH: 0
BACK PAIN: 0
ABDOMINAL DISTENTION: 0
EYE REDNESS: 0
BLOOD IN STOOL: 0
WHEEZING: 0
EYE DISCHARGE: 0
CONSTIPATION: 1
ANAL BLEEDING: 0
RECTAL PAIN: 0
EYE PAIN: 0
VOMITING: 0

## 2019-05-15 ENCOUNTER — OFFICE VISIT (OUTPATIENT)
Dept: FAMILY MEDICINE CLINIC | Age: 44
End: 2019-05-15
Payer: MEDICAID

## 2019-05-15 ENCOUNTER — HOSPITAL ENCOUNTER (OUTPATIENT)
Age: 44
Discharge: HOME | End: 2019-05-17
Payer: MEDICAID

## 2019-05-15 VITALS
SYSTOLIC BLOOD PRESSURE: 117 MMHG | HEART RATE: 82 BPM | DIASTOLIC BLOOD PRESSURE: 81 MMHG | HEIGHT: 64 IN | BODY MASS INDEX: 29.71 KG/M2 | WEIGHT: 174 LBS | OXYGEN SATURATION: 98 % | RESPIRATION RATE: 14 BRPM

## 2019-05-15 DIAGNOSIS — Z01.419 WELL FEMALE EXAM WITH ROUTINE GYNECOLOGICAL EXAM: ICD-10-CM

## 2019-05-15 DIAGNOSIS — K59.00 CONSTIPATION, UNSPECIFIED CONSTIPATION TYPE: Primary | ICD-10-CM

## 2019-05-15 DIAGNOSIS — F41.9 ANXIETY: ICD-10-CM

## 2019-05-15 DIAGNOSIS — R33.9 URINARY RETENTION: ICD-10-CM

## 2019-05-15 PROCEDURE — G8427 DOCREV CUR MEDS BY ELIG CLIN: HCPCS | Performed by: FAMILY MEDICINE

## 2019-05-15 PROCEDURE — G8417 CALC BMI ABV UP PARAM F/U: HCPCS | Performed by: FAMILY MEDICINE

## 2019-05-15 PROCEDURE — 87624 HPV HI-RISK TYP POOLED RSLT: CPT

## 2019-05-15 PROCEDURE — 99396 PREV VISIT EST AGE 40-64: CPT | Performed by: FAMILY MEDICINE

## 2019-05-15 PROCEDURE — 1036F TOBACCO NON-USER: CPT | Performed by: FAMILY MEDICINE

## 2019-05-15 RX ORDER — ESCITALOPRAM OXALATE 5 MG/1
5 TABLET ORAL DAILY
Qty: 30 TABLET | Refills: 1 | Status: SHIPPED | OUTPATIENT
Start: 2019-05-15 | End: 2019-06-03 | Stop reason: SDUPTHER

## 2019-05-15 NOTE — PROGRESS NOTES
Jayan Brandon  : 1975    Chief Complaint:     Chief Complaint   Patient presents with    Gynecologic Exam       HPI  Jayna Brandon 37 y.o. presents for   Chief Complaint   Patient presents with    Gynecologic Exam     This 37 y.o. woman comes in today for pap smear only. Her most recent annual exam was on unknown. Her most recent Pap smear was on unknown and showed no abnormalities. Previous abnormal Pap smears: no Contraception: condoms    Anxiety  Patient is here for evaluation of anxiety. She has the following anxiety symptoms: feelings of losing control, insomnia, irritable. Onset of symptoms was approximately 6 months ago. Symptoms have been gradually worsening since that time. She denies current suicidal and homicidal ideation. Family history significant for no psychiatric illness. Possible organic causes contributing are: none. Risk factors: none Previous treatment includes medication Paxil. She complains of the following medication side effects: none. She has tried all multiple medications in the past. She is not sure if she has tried Lexapro before. Urinary frequency better but still present. She did not get any of the ultrasounds completed. KUB did show constipation. She states that her urinary retention is better since she is not as constipated. However she has not been taking the MiraLAX daily. Did recommend this as she is still not going on a daily basis. All questions were answered to patients satisfaction.     Past Medical History:   Diagnosis Date    Acne     Anxiety     Dyslipidemia     History of tobacco use     Hypothyroidism     hx  no tx at present    Insomnia     Low back pain, episodic     Outbursts of anger        Past Surgical History:   Procedure Laterality Date     SECTION  2008     SECTION  2012    TUBAL LIGATION  2012       Social History     Socioeconomic History    Marital status:      Spouse name: None    Number of children: None    Years of education: None    Highest education level: None   Occupational History    Occupation: self-employed   Social Needs    Financial resource strain: None    Food insecurity:     Worry: None     Inability: None    Transportation needs:     Medical: None     Non-medical: None   Tobacco Use    Smoking status: Former Smoker     Packs/day: 0.25     Years: 5.00     Pack years: 1.25     Last attempt to quit: 2009     Years since quittin.7    Smokeless tobacco: Never Used    Tobacco comment: I smoked for 12 years then quit, then smoked occasionally until I quit 2009   Substance and Sexual Activity    Alcohol use: Yes     Comment: very rarely, 1 drink maybe one or twice a month    Drug use: No    Sexual activity: Yes     Partners: Male   Lifestyle    Physical activity:     Days per week: None     Minutes per session: None    Stress: None   Relationships    Social connections:     Talks on phone: None     Gets together: None     Attends [employer] service: None     Active member of club or organization: None     Attends meetings of clubs or organizations: None     Relationship status: None    Intimate partner violence:     Fear of current or ex partner: None     Emotionally abused: None     Physically abused: None     Forced sexual activity: None   Other Topics Concern    None   Social History Narrative    None       History reviewed. No pertinent family history. Current Outpatient Medications   Medication Sig Dispense Refill    escitalopram (LEXAPRO) 5 MG tablet Take 1 tablet by mouth daily 30 tablet 1    metroNIDAZOLE (FLAGYL) 500 MG tablet 1 tab po BID x 7 days 14 tablet 0    metroNIDAZOLE (METROGEL) 0.75 % gel Apply topically 2 times daily. 1 Tube 0    Crisaborole 2 % OINT Apply small amount on affected areas prn 1 Tube 0    gabapentin (NEURONTIN) 300 MG capsule Take 300 mg by mouth 2 times daily.       levothyroxine (SYNTHROID) 50 MCG tablet take 1 tablet by mouth once daily 90 tablet 1    clindamycin-benzoyl peroxide (BENZACLIN) 1-5 % gel Apply topically 2 times daily. 30 g 0    Omega-3 Fatty Acids (FISH OIL) 1200 MG CAPS Take by mouth      GINSENG PO Take by mouth      Ginkgo Biloba (GINKOBA PO) Take by mouth      tretinoin (RETIN-A) 0.025 % cream       Multiple Vitamin (MULTI-VITAMIN PO) Take  by mouth. No current facility-administered medications for this visit. No Known Allergies    Health Maintenance Due   Topic Date Due    Cervical cancer screen  03/08/2019    TSH testing  05/10/2019           REVIEW OF SYSTEMS  Review of Systems   Constitutional: Negative for activity change, appetite change, chills, diaphoresis, fatigue and fever. HENT: Negative for congestion and postnasal drip. Eyes: Negative for photophobia, pain, discharge, redness, itching and visual disturbance. Respiratory: Negative for cough, shortness of breath, wheezing and stridor. Cardiovascular: Negative for chest pain, palpitations and leg swelling. Gastrointestinal: Positive for constipation. Negative for abdominal distention, abdominal pain, anal bleeding, blood in stool, diarrhea, nausea, rectal pain and vomiting. Genitourinary: Positive for pelvic pain. Negative for decreased urine volume, difficulty urinating, dyspareunia, dysuria, enuresis, flank pain, frequency, genital sores, hematuria, urgency, vaginal bleeding, vaginal discharge and vaginal pain. Urinary hesitancy   Musculoskeletal: Negative for back pain and myalgias. Skin: Negative for color change, pallor and rash. Allergic/Immunologic: Negative for environmental allergies. Neurological: Negative for dizziness, light-headedness and headaches. Hematological: Negative for adenopathy. Psychiatric/Behavioral: Negative for sleep disturbance. The patient is not nervous/anxious.         PHYSICAL EXAM  /81 (Site: Left Upper Arm)   Pulse 82   Resp 14   Ht 5' 4\" (1.626 m)   Wt 174 lb (78.9 kg)   LMP 05/07/2019   SpO2 98%   BMI 29.87 kg/m²   Physical Exam   Constitutional: She is oriented to person, place, and time. She appears well-developed and well-nourished. HENT:   Head: Normocephalic and atraumatic. Right Ear: External ear normal.   Left Ear: External ear normal.   Nose: Nose normal.   Mouth/Throat: Oropharynx is clear and moist. No oropharyngeal exudate. Eyes: Conjunctivae and EOM are normal.   Neck: Normal range of motion. Neck supple. Cardiovascular: Normal rate, regular rhythm and normal heart sounds. No murmur heard. Pulmonary/Chest: Effort normal and breath sounds normal. She has no wheezes. Abdominal: Soft. Bowel sounds are normal. There is no tenderness. There is no guarding. Genitourinary: Rectum normal and uterus normal. Pelvic exam was performed with patient supine. There is no rash, tenderness or lesion on the right labia. There is no rash, tenderness or lesion on the left labia. Cervix exhibits no motion tenderness and no friability. Right adnexum displays no mass and no tenderness. Left adnexum displays no mass and no tenderness. Vaginal discharge found. Musculoskeletal: Normal range of motion. She exhibits no edema. Neurological: She is alert and oriented to person, place, and time. No cranial nerve deficit. Skin: Skin is warm and dry. No rash noted. Psychiatric: She has a normal mood and affect. Her behavior is normal.   Nursing note and vitals reviewed. Laboratory:   All laboratory and radiology results have been personally reviewed by myself    Lab Results   Component Value Date     05/10/2018    K 4.3 05/10/2018     05/10/2018    CO2 22 05/10/2018    BUN 14 05/10/2018    CREATININE 0.7 05/10/2018    PROT 6.9 05/10/2018    LABALBU 4.2 05/10/2018    CALCIUM 9.0 05/10/2018    GFRAA >60 05/10/2018    LABGLOM >60 05/10/2018    GLUCOSE 85 05/10/2018    AST 19 05/10/2018    ALT 13 05/10/2018    ALKPHOS 42 understanding, agrees,feels comfortable with and wishes to proceed with above treatment plan.     Advised patient to call Galdino Smaller new medication issues, and read all Rx info from pharmacy to assure aware of all possible risks and side effects of medication before taking.     Reviewed age and gender appropriate health screening exams and vaccinations. Advised patient regarding importance of keeping up with recommended health maintenance and toschedule as soon as possible if overdue, as this is important in assessing for undiagnosed pathology, especially cancer, as well as protecting against potentially harmful/life threatening disease.          Patient and/or guardian verbalizes understanding and agrees with above counseling, assessment and plan.     All questions answered. Linh Son DO  5/15/19    NOTE: This report was transcribed using voice recognition software.  Every effort was made to ensure accuracy; however, inadvertent computerized transcription errors may be present

## 2019-05-16 ASSESSMENT — ENCOUNTER SYMPTOMS
WHEEZING: 0
COLOR CHANGE: 0
VOMITING: 0
BLOOD IN STOOL: 0
ABDOMINAL PAIN: 0
STRIDOR: 0
EYE PAIN: 0
EYE DISCHARGE: 0
RECTAL PAIN: 0
ANAL BLEEDING: 0
ABDOMINAL DISTENTION: 0
COUGH: 0
NAUSEA: 0
BACK PAIN: 0
CONSTIPATION: 1
EYE ITCHING: 0
DIARRHEA: 0
SHORTNESS OF BREATH: 0
EYE REDNESS: 0
PHOTOPHOBIA: 0

## 2019-05-22 ENCOUNTER — HOSPITAL ENCOUNTER (OUTPATIENT)
Dept: ULTRASOUND IMAGING | Age: 44
Discharge: HOME | End: 2019-05-24
Payer: MEDICAID

## 2019-05-22 DIAGNOSIS — R33.9 URINARY RETENTION: ICD-10-CM

## 2019-05-22 DIAGNOSIS — R10.2 PAIN IN FEMALE PELVIS: ICD-10-CM

## 2019-05-22 PROCEDURE — 76830 TRANSVAGINAL US NON-OB: CPT

## 2019-05-22 PROCEDURE — 76857 US EXAM PELVIC LIMITED: CPT

## 2019-05-23 LAB
CORRESPONDING PAP CASE #: NORMAL
HPV, HIGH RISK: NEGATIVE

## 2019-05-24 ENCOUNTER — PATIENT MESSAGE (OUTPATIENT)
Dept: FAMILY MEDICINE CLINIC | Age: 44
End: 2019-05-24

## 2019-06-03 RX ORDER — ESCITALOPRAM OXALATE 10 MG/1
10 TABLET ORAL DAILY
Qty: 30 TABLET | Refills: 2 | Status: SHIPPED | OUTPATIENT
Start: 2019-06-03 | End: 2019-06-14

## 2019-06-12 ENCOUNTER — PATIENT MESSAGE (OUTPATIENT)
Dept: FAMILY MEDICINE CLINIC | Age: 44
End: 2019-06-12

## 2019-06-13 ENCOUNTER — PATIENT MESSAGE (OUTPATIENT)
Dept: FAMILY MEDICINE CLINIC | Age: 44
End: 2019-06-13

## 2019-06-13 NOTE — TELEPHONE ENCOUNTER
From: Carolyn Khan  To: Andrey Poster, DO  Sent: 6/12/2019 5:40 PM EDT  Subject: Prescription Question    I am on the higher dosage of lexipro and I don't have the rage but I do have anxiety and frustration. I am also having sexual side effects where orgasm is impossible.  What do you suggest?

## 2019-06-14 RX ORDER — VENLAFAXINE HYDROCHLORIDE 37.5 MG/1
37.5 CAPSULE, EXTENDED RELEASE ORAL DAILY
Qty: 30 CAPSULE | Refills: 1 | Status: SHIPPED | OUTPATIENT
Start: 2019-06-14 | End: 2019-08-04 | Stop reason: SDUPTHER

## 2019-06-21 RX ORDER — GABAPENTIN 300 MG/1
CAPSULE ORAL
Qty: 120 CAPSULE | Refills: 3 | Status: SHIPPED
Start: 2019-06-21 | End: 2020-07-02 | Stop reason: SDUPTHER

## 2019-06-26 ENCOUNTER — HOSPITAL ENCOUNTER (OUTPATIENT)
Age: 44
Discharge: HOME | End: 2019-06-28
Payer: MEDICAID

## 2019-06-26 ENCOUNTER — OFFICE VISIT (OUTPATIENT)
Dept: FAMILY MEDICINE CLINIC | Age: 44
End: 2019-06-26
Payer: MEDICAID

## 2019-06-26 VITALS
OXYGEN SATURATION: 100 % | RESPIRATION RATE: 14 BRPM | BODY MASS INDEX: 28.85 KG/M2 | DIASTOLIC BLOOD PRESSURE: 73 MMHG | HEIGHT: 64 IN | HEART RATE: 93 BPM | WEIGHT: 169 LBS | SYSTOLIC BLOOD PRESSURE: 122 MMHG

## 2019-06-26 DIAGNOSIS — E55.9 VITAMIN D DEFICIENCY: ICD-10-CM

## 2019-06-26 DIAGNOSIS — F41.9 ANXIETY: Primary | ICD-10-CM

## 2019-06-26 DIAGNOSIS — E03.9 ACQUIRED HYPOTHYROIDISM: ICD-10-CM

## 2019-06-26 LAB
TSH SERPL DL<=0.05 MIU/L-ACNC: 1.34 UIU/ML (ref 0.27–4.2)
VITAMIN D 25-HYDROXY: 42 NG/ML (ref 30–100)

## 2019-06-26 PROCEDURE — G8427 DOCREV CUR MEDS BY ELIG CLIN: HCPCS | Performed by: FAMILY MEDICINE

## 2019-06-26 PROCEDURE — 84443 ASSAY THYROID STIM HORMONE: CPT

## 2019-06-26 PROCEDURE — G8417 CALC BMI ABV UP PARAM F/U: HCPCS | Performed by: FAMILY MEDICINE

## 2019-06-26 PROCEDURE — 99214 OFFICE O/P EST MOD 30 MIN: CPT | Performed by: FAMILY MEDICINE

## 2019-06-26 PROCEDURE — 1036F TOBACCO NON-USER: CPT | Performed by: FAMILY MEDICINE

## 2019-06-26 PROCEDURE — 82306 VITAMIN D 25 HYDROXY: CPT

## 2019-06-26 RX ORDER — PAROXETINE 10 MG/1
TABLET, FILM COATED ORAL
Refills: 0 | COMMUNITY
Start: 2019-06-04 | End: 2019-06-26

## 2019-06-26 NOTE — PROGRESS NOTES
Cyrus Brenner  : 1975    Chief Complaint:     Chief Complaint   Patient presents with    Anxiety       HPI  Shannen Eaton 37 y.o. presents for   Chief Complaint   Patient presents with    Anxiety     She continues to complain of anxiety. However she does state that she has much stress recently. We did switch her over to Effexor due to sexual dysfunction from Lexapro. She states she has only been on this for 1 week and has not noticed much of a difference. She also is due for labs. She has had low vitamin D prior and is currently on Synthroid. She has been taking these medications as prescribed. otherwise feels well and denies any current complaints. She has been treated for yeast infection per her gynecologist.    All questions were answered to patients satisfaction.     Past Medical History:   Diagnosis Date    Acne     Anxiety     Dyslipidemia     History of tobacco use     Hypothyroidism     hx  no tx at present    Insomnia     Low back pain, episodic     Outbursts of anger        Past Surgical History:   Procedure Laterality Date     SECTION  2008     SECTION  2012    TUBAL LIGATION  2012       Social History     Socioeconomic History    Marital status:      Spouse name: None    Number of children: None    Years of education: None    Highest education level: None   Occupational History    Occupation: self-employed   Social Needs    Financial resource strain: None    Food insecurity:     Worry: None     Inability: None    Transportation needs:     Medical: None     Non-medical: None   Tobacco Use    Smoking status: Former Smoker     Packs/day: 0.25     Years: 5.00     Pack years: 1.25     Last attempt to quit: 2009     Years since quittin.8    Smokeless tobacco: Never Used    Tobacco comment: I smoked for 12 years then quit, then smoked occasionally until I quit 2009   Substance and Sexual Activity    Alcohol use: Yes     Comment: very rarely, 1 drink maybe one or twice a month    Drug use: No    Sexual activity: Yes     Partners: Male   Lifestyle    Physical activity:     Days per week: None     Minutes per session: None    Stress: None   Relationships    Social connections:     Talks on phone: None     Gets together: None     Attends [employer] service: None     Active member of club or organization: None     Attends meetings of clubs or organizations: None     Relationship status: None    Intimate partner violence:     Fear of current or ex partner: None     Emotionally abused: None     Physically abused: None     Forced sexual activity: None   Other Topics Concern    None   Social History Narrative    None       History reviewed. No pertinent family history. Current Outpatient Medications   Medication Sig Dispense Refill    gabapentin (NEURONTIN) 300 MG capsule take 2 capsules by mouth twice a day 120 capsule 3    venlafaxine (EFFEXOR XR) 37.5 MG extended release capsule Take 1 capsule by mouth daily 30 capsule 1    Crisaborole 2 % OINT Apply small amount on affected areas prn 1 Tube 0    levothyroxine (SYNTHROID) 50 MCG tablet take 1 tablet by mouth once daily 90 tablet 1    clindamycin-benzoyl peroxide (BENZACLIN) 1-5 % gel Apply topically 2 times daily. 30 g 0    Omega-3 Fatty Acids (FISH OIL) 1200 MG CAPS Take by mouth      GINSENG PO Take by mouth      Ginkgo Biloba (GINKOBA PO) Take by mouth      tretinoin (RETIN-A) 0.025 % cream       Multiple Vitamin (MULTI-VITAMIN PO) Take  by mouth. No current facility-administered medications for this visit. No Known Allergies    Health Maintenance Due   Topic Date Due    TSH testing  05/10/2019           REVIEW OF SYSTEMS  Review of Systems   Constitutional: Negative for fatigue and fever. HENT: Negative for ear pain, sinus pressure, sneezing and sore throat. Eyes: Negative for pain.    Respiratory: Negative for cough and shortness of breath. Cardiovascular: Negative for chest pain and leg swelling. Gastrointestinal: Negative for abdominal pain, constipation and diarrhea. Genitourinary: Negative for dysuria and urgency. Musculoskeletal: Negative for back pain and myalgias. Skin: Negative for rash. Allergic/Immunologic: Negative for food allergies. Neurological: Negative for light-headedness and headaches. Hematological: Does not bruise/bleed easily. Psychiatric/Behavioral: Negative for behavioral problems and sleep disturbance. The patient is nervous/anxious. PHYSICAL EXAM  /73 (Site: Left Upper Arm)   Pulse 93   Resp 14   Ht 5' 4\" (1.626 m)   Wt 169 lb (76.7 kg)   SpO2 100%   BMI 29.01 kg/m²   Physical Exam   Constitutional: She is oriented to person, place, and time. She appears well-developed and well-nourished. HENT:   Head: Normocephalic and atraumatic. Right Ear: External ear normal.   Left Ear: External ear normal.   Nose: Nose normal.   Mouth/Throat: Oropharynx is clear and moist. No oropharyngeal exudate. Eyes: Conjunctivae and EOM are normal.   Neck: Normal range of motion. Neck supple. Cardiovascular: Normal rate, regular rhythm and normal heart sounds. No murmur heard. Pulmonary/Chest: Effort normal and breath sounds normal. She has no wheezes. Abdominal: Soft. Bowel sounds are normal. There is no tenderness. There is no guarding. Musculoskeletal: Normal range of motion. She exhibits no edema. Neurological: She is alert and oriented to person, place, and time. No cranial nerve deficit. Skin: Skin is warm and dry. No rash noted. Psychiatric: She has a normal mood and affect. Her behavior is normal.   Nursing note and vitals reviewed. Laboratory:   All laboratory and radiology results have been personally reviewed by myself    Lab Results   Component Value Date     05/10/2018    K 4.3 05/10/2018     05/10/2018    CO2 22 05/10/2018    BUN 14 05/10/2018 to proceed with above treatment plan.     Advised patient to call Amy Lawrence new medication issues, and read all Rx info from pharmacy to assure aware of all possible risks and side effects of medication before taking.     Reviewed age and gender appropriate health screening exams and vaccinations. Advised patient regarding importance of keeping up with recommended health maintenance and toschedule as soon as possible if overdue, as this is important in assessing for undiagnosed pathology, especially cancer, as well as protecting against potentially harmful/life threatening disease.          Patient and/or guardian verbalizes understanding and agrees with above counseling, assessment and plan.     All questions answered. Kip 5, DO  6/26/19    NOTE: This report was transcribed using voice recognition software.  Every effort was made to ensure accuracy; however, inadvertent computerized transcription errors may be present

## 2019-06-27 ASSESSMENT — ENCOUNTER SYMPTOMS
SHORTNESS OF BREATH: 0
EYE PAIN: 0
DIARRHEA: 0
COUGH: 0
BACK PAIN: 0
SINUS PRESSURE: 0
CONSTIPATION: 0
SORE THROAT: 0
ABDOMINAL PAIN: 0

## 2019-07-21 DIAGNOSIS — E03.9 ACQUIRED HYPOTHYROIDISM: ICD-10-CM

## 2019-07-22 RX ORDER — LEVOTHYROXINE SODIUM 0.05 MG/1
TABLET ORAL
Qty: 90 TABLET | Refills: 1 | Status: SHIPPED | OUTPATIENT
Start: 2019-07-22 | End: 2019-09-19 | Stop reason: SDUPTHER

## 2019-08-06 RX ORDER — VENLAFAXINE HYDROCHLORIDE 37.5 MG/1
CAPSULE, EXTENDED RELEASE ORAL
Qty: 30 CAPSULE | Refills: 1 | Status: SHIPPED | OUTPATIENT
Start: 2019-08-06 | End: 2019-09-05

## 2019-09-04 ENCOUNTER — PATIENT MESSAGE (OUTPATIENT)
Dept: FAMILY MEDICINE CLINIC | Age: 44
End: 2019-09-04

## 2019-09-05 RX ORDER — VENLAFAXINE HYDROCHLORIDE 75 MG/1
CAPSULE, EXTENDED RELEASE ORAL
Qty: 30 CAPSULE | Refills: 2 | Status: SHIPPED | OUTPATIENT
Start: 2019-09-05 | End: 2019-09-05

## 2019-09-05 RX ORDER — VENLAFAXINE HYDROCHLORIDE 37.5 MG/1
75 CAPSULE, EXTENDED RELEASE ORAL DAILY
Qty: 30 CAPSULE | Refills: 3 | Status: SHIPPED | OUTPATIENT
Start: 2019-09-05 | End: 2019-10-26 | Stop reason: SDUPTHER

## 2019-09-05 NOTE — TELEPHONE ENCOUNTER
From: Latoya Warren  To: Kip 5, DO  Sent: 9/4/2019 11:42 PM EDT  Subject: Prescription Question    I have been on the eflexor for a while now, is it possible to increase? Overall I think it's working and I don't want to change but I keep having more and more days of agitatedness The sexual side effects seem to be nonexistent with this one.

## 2019-09-19 DIAGNOSIS — E03.9 ACQUIRED HYPOTHYROIDISM: ICD-10-CM

## 2019-09-19 RX ORDER — LEVOTHYROXINE SODIUM 0.05 MG/1
50 TABLET ORAL DAILY
Qty: 90 TABLET | Refills: 1 | Status: SHIPPED
Start: 2019-09-19 | End: 2020-02-11

## 2019-10-23 ENCOUNTER — NURSE ONLY (OUTPATIENT)
Dept: FAMILY MEDICINE CLINIC | Age: 44
End: 2019-10-23
Payer: MEDICAID

## 2019-10-23 DIAGNOSIS — Z23 INFLUENZA VACCINE NEEDED: Primary | ICD-10-CM

## 2019-10-23 PROCEDURE — 90686 IIV4 VACC NO PRSV 0.5 ML IM: CPT | Performed by: FAMILY MEDICINE

## 2019-10-23 PROCEDURE — 90471 IMMUNIZATION ADMIN: CPT | Performed by: FAMILY MEDICINE

## 2019-10-29 RX ORDER — VENLAFAXINE HYDROCHLORIDE 37.5 MG/1
CAPSULE, EXTENDED RELEASE ORAL
Qty: 30 CAPSULE | Refills: 3 | Status: SHIPPED
Start: 2019-10-29 | End: 2020-03-18

## 2020-02-11 RX ORDER — LEVOTHYROXINE SODIUM 0.05 MG/1
TABLET ORAL
Qty: 90 TABLET | Refills: 1 | Status: SHIPPED
Start: 2020-02-11 | End: 2020-07-02 | Stop reason: SDUPTHER

## 2020-02-12 ENCOUNTER — PATIENT MESSAGE (OUTPATIENT)
Dept: FAMILY MEDICINE CLINIC | Age: 45
End: 2020-02-12

## 2020-02-13 RX ORDER — OSELTAMIVIR PHOSPHATE 75 MG/1
75 CAPSULE ORAL DAILY
Qty: 7 CAPSULE | Refills: 0 | Status: SHIPPED | OUTPATIENT
Start: 2020-02-13 | End: 2020-02-20

## 2020-02-21 RX ORDER — VENLAFAXINE HYDROCHLORIDE 75 MG/1
CAPSULE, EXTENDED RELEASE ORAL
Qty: 30 CAPSULE | Refills: 2 | Status: SHIPPED
Start: 2020-02-21 | End: 2020-03-17

## 2020-03-07 ENCOUNTER — PATIENT MESSAGE (OUTPATIENT)
Dept: FAMILY MEDICINE CLINIC | Age: 45
End: 2020-03-07

## 2020-03-09 NOTE — TELEPHONE ENCOUNTER
From: Pina Torres  To: Melissa Melgar DO  Sent: 3/7/2020 9:10 PM EST  Subject: Prescription Question    I sent a message on 3/1 about increasing my medication; is that a possibility? I didn't hear back from anyone.

## 2020-03-09 NOTE — TELEPHONE ENCOUNTER
Needs an appointment to discuss as well as her other issue to further evaluate.  Has not been seen since June 2019

## 2020-03-17 RX ORDER — VENLAFAXINE HYDROCHLORIDE 75 MG/1
CAPSULE, EXTENDED RELEASE ORAL
Qty: 30 CAPSULE | Refills: 2 | Status: SHIPPED
Start: 2020-03-17 | End: 2020-03-18

## 2020-03-18 ENCOUNTER — TELEPHONE (OUTPATIENT)
Dept: FAMILY MEDICINE CLINIC | Age: 45
End: 2020-03-18

## 2020-03-18 RX ORDER — VENLAFAXINE HYDROCHLORIDE 150 MG/1
150 CAPSULE, EXTENDED RELEASE ORAL DAILY
Qty: 30 CAPSULE | Refills: 2 | Status: SHIPPED
Start: 2020-03-18 | End: 2020-05-13

## 2020-03-18 NOTE — TELEPHONE ENCOUNTER
Called pt d/t video visit not working. Pt is asking for a refill on her Effexor to [facility]. She is also c/o of pain on her skin, she states no spots on her skin just pain. She states this happens occasionally. She states the latest pain started on her lower stomach and travelled to her knees and then her knees were hurting. She states it has been going on for approximately 2 years, at one time it was her wrist that had the pain on her skin but again, no marks.

## 2020-05-13 RX ORDER — VENLAFAXINE HYDROCHLORIDE 150 MG/1
CAPSULE, EXTENDED RELEASE ORAL
Qty: 30 CAPSULE | Refills: 2 | Status: SHIPPED
Start: 2020-05-13 | End: 2020-06-08 | Stop reason: SDUPTHER

## 2020-06-09 RX ORDER — VENLAFAXINE HYDROCHLORIDE 150 MG/1
150 CAPSULE, EXTENDED RELEASE ORAL DAILY
Qty: 30 CAPSULE | Refills: 0 | Status: SHIPPED
Start: 2020-06-09 | End: 2020-07-02 | Stop reason: SDUPTHER

## 2020-06-11 ENCOUNTER — TELEPHONE (OUTPATIENT)
Dept: ADMINISTRATIVE | Age: 45
End: 2020-06-11

## 2020-06-11 NOTE — TELEPHONE ENCOUNTER
Returned call to patient, left message stating returning call to schedule an appt with doctor Colten Ledesma - please call us back.

## 2020-07-02 ENCOUNTER — OFFICE VISIT (OUTPATIENT)
Dept: FAMILY MEDICINE CLINIC | Age: 45
End: 2020-07-02
Payer: MEDICAID

## 2020-07-02 ENCOUNTER — HOSPITAL ENCOUNTER (OUTPATIENT)
Age: 45
Discharge: HOME | End: 2020-07-04
Payer: MEDICAID

## 2020-07-02 VITALS
BODY MASS INDEX: 29.71 KG/M2 | SYSTOLIC BLOOD PRESSURE: 114 MMHG | DIASTOLIC BLOOD PRESSURE: 84 MMHG | WEIGHT: 174 LBS | OXYGEN SATURATION: 95 % | RESPIRATION RATE: 16 BRPM | HEIGHT: 64 IN | TEMPERATURE: 98.7 F | HEART RATE: 96 BPM

## 2020-07-02 LAB — TSH SERPL DL<=0.05 MIU/L-ACNC: 1.42 UIU/ML (ref 0.27–4.2)

## 2020-07-02 PROCEDURE — 84443 ASSAY THYROID STIM HORMONE: CPT

## 2020-07-02 PROCEDURE — G8419 CALC BMI OUT NRM PARAM NOF/U: HCPCS | Performed by: FAMILY MEDICINE

## 2020-07-02 PROCEDURE — 99214 OFFICE O/P EST MOD 30 MIN: CPT | Performed by: FAMILY MEDICINE

## 2020-07-02 PROCEDURE — G8427 DOCREV CUR MEDS BY ELIG CLIN: HCPCS | Performed by: FAMILY MEDICINE

## 2020-07-02 PROCEDURE — 1036F TOBACCO NON-USER: CPT | Performed by: FAMILY MEDICINE

## 2020-07-02 RX ORDER — VENLAFAXINE HYDROCHLORIDE 150 MG/1
150 CAPSULE, EXTENDED RELEASE ORAL DAILY
Qty: 30 CAPSULE | Refills: 0 | Status: SHIPPED
Start: 2020-07-02 | End: 2020-11-10

## 2020-07-02 RX ORDER — GABAPENTIN 300 MG/1
CAPSULE ORAL
Qty: 120 CAPSULE | Refills: 3 | Status: SHIPPED
Start: 2020-07-02 | End: 2020-10-06 | Stop reason: SDUPTHER

## 2020-07-02 RX ORDER — LEVOTHYROXINE SODIUM 0.05 MG/1
TABLET ORAL
Qty: 90 TABLET | Refills: 1 | Status: SHIPPED
Start: 2020-07-02 | End: 2020-12-02 | Stop reason: SDUPTHER

## 2020-07-02 ASSESSMENT — PATIENT HEALTH QUESTIONNAIRE - PHQ9
SUM OF ALL RESPONSES TO PHQ QUESTIONS 1-9: 0
SUM OF ALL RESPONSES TO PHQ QUESTIONS 1-9: 0
2. FEELING DOWN, DEPRESSED OR HOPELESS: 0
1. LITTLE INTEREST OR PLEASURE IN DOING THINGS: 0
SUM OF ALL RESPONSES TO PHQ9 QUESTIONS 1 & 2: 0

## 2020-07-02 NOTE — PROGRESS NOTES
Mirza Bio  : 1975    Chief Complaint:     Chief Complaint   Patient presents with    Anxiety       HPI  Mirza Bio 40 y.o. presents for   Chief Complaint   Patient presents with    Anxiety     Anxiety is under good control. She does not have the sexual side effects she did form the other medication. She wishes to keep the same dose for now. She has been taking thyroid supplementation. She does wish for something for sweating. She has tried prescription deodorant. She is due for mammogram as well. All questions were answered to patients satisfaction.     Past Medical History:   Diagnosis Date    Acne     Anxiety     Dyslipidemia     History of tobacco use     Hypothyroidism     hx  no tx at present    Insomnia     Low back pain, episodic     Outbursts of anger        Past Surgical History:   Procedure Laterality Date     SECTION  2008     SECTION  2012    TUBAL LIGATION  2012       Social History     Socioeconomic History    Marital status:      Spouse name: None    Number of children: None    Years of education: None    Highest education level: None   Occupational History    Occupation: self-employed   Social Needs    Financial resource strain: None    Food insecurity     Worry: None     Inability: None    Transportation needs     Medical: None     Non-medical: None   Tobacco Use    Smoking status: Former Smoker     Packs/day: 0.25     Years: 5.00     Pack years: 1.25     Last attempt to quit: 2009     Years since quitting: 10.8    Smokeless tobacco: Never Used    Tobacco comment: I smoked for 12 years then quit, then smoked occasionally until I quit 2009   Substance and Sexual Activity    Alcohol use: Yes     Comment: very rarely, 1 drink maybe one or twice a month    Drug use: No    Sexual activity: Yes     Partners: Male   Lifestyle    Physical activity     Days per week: None     Minutes per session: None Sweating   Allergic/Immunologic: Negative for food allergies. Neurological: Negative for light-headedness and headaches. Hematological: Does not bruise/bleed easily. Psychiatric/Behavioral: Negative for behavioral problems and sleep disturbance. PHYSICAL EXAM  /84 (Site: Right Upper Arm, Position: Sitting, Cuff Size: Large Adult)   Pulse 96   Temp 98.7 °F (37.1 °C) (Temporal)   Resp 16   Ht 5' 4\" (1.626 m)   Wt 174 lb (78.9 kg)   SpO2 95%   BMI 29.87 kg/m²   Physical Exam  Vitals signs and nursing note reviewed. Constitutional:       Appearance: She is well-developed. HENT:      Head: Normocephalic and atraumatic. Right Ear: External ear normal.      Left Ear: External ear normal.      Nose: Nose normal.   Eyes:      Conjunctiva/sclera: Conjunctivae normal.   Neck:      Musculoskeletal: Normal range of motion and neck supple. Thyroid: No thyromegaly. Cardiovascular:      Rate and Rhythm: Normal rate and regular rhythm. Heart sounds: Normal heart sounds. No murmur. No friction rub. No gallop. Pulmonary:      Effort: Pulmonary effort is normal.      Breath sounds: Normal breath sounds. No wheezing. Abdominal:      Palpations: Abdomen is soft. Tenderness: There is no abdominal tenderness. Musculoskeletal: Normal range of motion. General: No tenderness. Lymphadenopathy:      Cervical: No cervical adenopathy. Skin:     General: Skin is warm and dry. Findings: No rash. Neurological:      Mental Status: She is alert and oriented to person, place, and time. Deep Tendon Reflexes: Reflexes are normal and symmetric. Psychiatric:         Behavior: Behavior normal.              Laboratory:   All laboratory and radiology results have been personally reviewed by myself    Lab Results   Component Value Date     05/10/2018    K 4.3 05/10/2018     05/10/2018    CO2 22 05/10/2018    BUN 14 05/10/2018    CREATININE 0.7 05/10/2018    PROT 6.9 05/10/2018    LABALBU 4.2 05/10/2018    CALCIUM 9.0 05/10/2018    GFRAA >60 05/10/2018    LABGLOM >60 05/10/2018    GLUCOSE 85 05/10/2018    AST 19 05/10/2018    ALT 13 05/10/2018    ALKPHOS 42 05/10/2018    BILITOT 0.6 05/10/2018    TSH 1.420 07/02/2020    VITD25 42 06/26/2019    CHOL 175 05/10/2018    TRIG 103 05/10/2018    HDL 55 05/10/2018    LDLCALC 99 05/10/2018        Lab Results   Component Value Date    CHOL 175 05/10/2018    CHOL 171 10/04/2013     Lab Results   Component Value Date    TRIG 103 05/10/2018    TRIG 83 10/04/2013     Lab Results   Component Value Date    HDL 55 05/10/2018    HDL 54.0 10/04/2013     Lab Results   Component Value Date    LDLCALC 99 05/10/2018    LDLCALC 100 (H) 10/04/2013       No results found for: LABA1C  Lab Results   Component Value Date    LDLCALC 99 05/10/2018    CREATININE 0.7 05/10/2018       ASSESSMENT/PLAN:     Diagnosis Orders   1. Acquired hypothyroidism  levothyroxine (SYNTHROID) 50 MCG tablet    TSH   2. Anxiety     3. Excessive sweating     4. Screening for malignant neoplasm of breast  ITA DIGITAL SCREEN BILATERAL PER PROTOCOL     Anxiety under good control continue current therapy. On thyroid supplementation. Will check tsh. drysol given. Side effects of medication were reviewed with patient. Mammogram ordered. Problem list reviewed andsimplified/updated  HM reviewed today and counseled as appropriate    Call or go to ED immediately if symptoms worsen or persist.  Future Appointments   Date Time Provider Minerva Falcon   7/6/2021  1:30 PM DO Stevo Hale [facility]     Or sooner if necessary.       Educational materials and/or homeexercises printed for patient's review and were included in patient instructions on his/her After Visit Summary and given to patient at the end of visit.       Counseled regarding above diagnosis, including possible risks and complications,  especially if left uncontrolled.     Counseled regarding the possible side effects, risks, benefits and alternatives to treatment; patient and/or guardian verbalizes understanding, agrees,feels comfortable with and wishes to proceed with above treatment plan.     Advised patient to call Johnny Bella new medication issues, and read all Rx info from pharmacy to assure aware of all possible risks and side effects of medication before taking.     Reviewed age and gender appropriate health screening exams and vaccinations. Advised patient regarding importance of keeping up with recommended health maintenance and toschedule as soon as possible if overdue, as this is important in assessing for undiagnosed pathology, especially cancer, as well as protecting against potentially harmful/life threatening disease.          Patient and/or guardian verbalizes understanding and agrees with above counseling, assessment and plan.     All questions answered. Kip 5, DO  7/2/20    NOTE: This report was transcribed using voice recognition software.  Every effort was made to ensure accuracy; however, inadvertent computerized transcription errors may be present

## 2020-07-05 ENCOUNTER — PATIENT MESSAGE (OUTPATIENT)
Dept: FAMILY MEDICINE CLINIC | Age: 45
End: 2020-07-05

## 2020-07-05 ASSESSMENT — ENCOUNTER SYMPTOMS
BACK PAIN: 0
ABDOMINAL PAIN: 0
CONSTIPATION: 0
EYE PAIN: 0
SHORTNESS OF BREATH: 0
COUGH: 0
ROS SKIN COMMENTS: SWEATING
DIARRHEA: 0
SINUS PRESSURE: 0
SORE THROAT: 0

## 2020-07-06 NOTE — TELEPHONE ENCOUNTER
From: Negrita Mendoza  To: Roberto Machuca DO  Sent: 7/5/2020 3:46 PM EDT  Subject: Visit Follow-Up Question    I applied the dry sol for the first two nights and it definitely worked but under my arms broke out in a rash that was all red and bumpy. I did not apply it last night because it hurt. Do you have any suggestions?

## 2020-07-30 RX ORDER — TRIAMCINOLONE ACETONIDE 1 MG/G
CREAM TOPICAL
Qty: 45 G | Refills: 0 | Status: SHIPPED
Start: 2020-07-30 | End: 2020-08-26

## 2020-08-04 ENCOUNTER — OFFICE VISIT (OUTPATIENT)
Dept: FAMILY MEDICINE CLINIC | Age: 45
End: 2020-08-04
Payer: COMMERCIAL

## 2020-08-04 VITALS
SYSTOLIC BLOOD PRESSURE: 138 MMHG | WEIGHT: 172.4 LBS | HEIGHT: 64 IN | HEART RATE: 86 BPM | OXYGEN SATURATION: 98 % | TEMPERATURE: 98.1 F | BODY MASS INDEX: 29.43 KG/M2 | RESPIRATION RATE: 18 BRPM | DIASTOLIC BLOOD PRESSURE: 77 MMHG

## 2020-08-04 PROCEDURE — 1036F TOBACCO NON-USER: CPT | Performed by: FAMILY MEDICINE

## 2020-08-04 PROCEDURE — G8419 CALC BMI OUT NRM PARAM NOF/U: HCPCS | Performed by: FAMILY MEDICINE

## 2020-08-04 PROCEDURE — G8427 DOCREV CUR MEDS BY ELIG CLIN: HCPCS | Performed by: FAMILY MEDICINE

## 2020-08-04 PROCEDURE — 99214 OFFICE O/P EST MOD 30 MIN: CPT | Performed by: FAMILY MEDICINE

## 2020-08-04 RX ORDER — VALACYCLOVIR HYDROCHLORIDE 1 G/1
1000 TABLET, FILM COATED ORAL 3 TIMES DAILY
Qty: 21 TABLET | Refills: 0 | Status: SHIPPED | OUTPATIENT
Start: 2020-08-04 | End: 2020-08-11

## 2020-08-04 ASSESSMENT — ENCOUNTER SYMPTOMS
DIARRHEA: 0
SORE THROAT: 0
BACK PAIN: 0
SINUS PRESSURE: 0
SHORTNESS OF BREATH: 0
ABDOMINAL PAIN: 0
ROS SKIN COMMENTS: SWEATING
COUGH: 0
EYE PAIN: 0
CONSTIPATION: 0

## 2020-08-04 NOTE — PROGRESS NOTES
Stacey Pennington  : 1975    Chief Complaint:     Chief Complaint   Patient presents with    Skin Problem     started on buttock, has some pain in her joints       HPI  Shannen Eaton 40 y.o. presents for   Chief Complaint   Patient presents with    Skin Problem     started on buttock, has some pain in her joints     Patient presents for left buttock lesion that has been present for the past few days. She states this has come and go now 3 times this year. She also gets pain down her leg at times. She has never been evaluated for this before. She is on gabapentin for her back but she only takes 1 tablet a day. This sometimes helps her back. The Drysol has helped with her sweating. The triamcinolone cream is working better than the cortisone cream over-the-counter. All questions were answered to patients satisfaction.     Past Medical History:   Diagnosis Date    Acne     Anxiety     Dyslipidemia     History of tobacco use     Hypothyroidism     hx  no tx at present    Insomnia     Low back pain, episodic     Outbursts of anger        Past Surgical History:   Procedure Laterality Date     SECTION  2008     SECTION  2012    TUBAL LIGATION  2012       Social History     Socioeconomic History    Marital status:      Spouse name: None    Number of children: None    Years of education: None    Highest education level: None   Occupational History    Occupation: self-employed   Social Needs    Financial resource strain: None    Food insecurity     Worry: None     Inability: None    Transportation needs     Medical: None     Non-medical: None   Tobacco Use    Smoking status: Former Smoker     Packs/day: 0.25     Years: 5.00     Pack years: 1.25     Last attempt to quit: 2009     Years since quitting: 10.9    Smokeless tobacco: Never Used    Tobacco comment: I smoked for 12 years then quit, then smoked occasionally until I quit 8/2009   Substance and Sexual Activity    Alcohol use: Yes     Comment: very rarely, 1 drink maybe one or twice a month    Drug use: No    Sexual activity: Yes     Partners: Male   Lifestyle    Physical activity     Days per week: None     Minutes per session: None    Stress: None   Relationships    Social connections     Talks on phone: None     Gets together: None     Attends Congregation service: None     Active member of club or organization: None     Attends meetings of clubs or organizations: None     Relationship status: None    Intimate partner violence     Fear of current or ex partner: None     Emotionally abused: None     Physically abused: None     Forced sexual activity: None   Other Topics Concern    None   Social History Narrative    None       No family history on file. Current Outpatient Medications   Medication Sig Dispense Refill    valACYclovir (VALTREX) 1 g tablet Take 1 tablet by mouth 3 times daily for 7 days 21 tablet 0    triamcinolone (KENALOG) 0.1 % cream Apply topically 2 times daily. 45 g 0    venlafaxine (EFFEXOR XR) 150 MG extended release capsule Take 1 capsule by mouth daily 30 capsule 0    levothyroxine (SYNTHROID) 50 MCG tablet take 1 tablet by mouth once daily 90 tablet 1    aluminum chloride (DRYSOL) 20 % external solution Apply topically nightly. 1 Bottle 2    Multiple Vitamin (MULTI-VITAMIN PO) Take  by mouth.  gabapentin (NEURONTIN) 300 MG capsule take 2 capsules by mouth twice a day 120 capsule 3     No current facility-administered medications for this visit. No Known Allergies    Health Maintenance Due   Topic Date Due    Breast cancer screen  05/10/2019           REVIEW OF SYSTEMS  Review of Systems   Constitutional: Negative for fatigue and fever. HENT: Negative for ear pain, sinus pressure, sneezing and sore throat. Eyes: Negative for pain. Respiratory: Negative for cough and shortness of breath.     Cardiovascular: Negative for chest symmetric. Psychiatric:         Behavior: Behavior normal.              Laboratory: All laboratory and radiology results have been personally reviewed by myself    Lab Results   Component Value Date     05/10/2018    K 4.3 05/10/2018     05/10/2018    CO2 22 05/10/2018    BUN 14 05/10/2018    CREATININE 0.7 05/10/2018    PROT 6.9 05/10/2018    LABALBU 4.2 05/10/2018    CALCIUM 9.0 05/10/2018    GFRAA >60 05/10/2018    LABGLOM >60 05/10/2018    GLUCOSE 85 05/10/2018    AST 19 05/10/2018    ALT 13 05/10/2018    ALKPHOS 42 05/10/2018    BILITOT 0.6 05/10/2018    TSH 1.420 07/02/2020    VITD25 42 06/26/2019    CHOL 175 05/10/2018    TRIG 103 05/10/2018    HDL 55 05/10/2018    LDLCALC 99 05/10/2018        Lab Results   Component Value Date    CHOL 175 05/10/2018    CHOL 171 10/04/2013     Lab Results   Component Value Date    TRIG 103 05/10/2018    TRIG 83 10/04/2013     Lab Results   Component Value Date    HDL 55 05/10/2018    HDL 54.0 10/04/2013     Lab Results   Component Value Date    LDLCALC 99 05/10/2018    LDLCALC 100 (H) 10/04/2013       No results found for: LABA1C  Lab Results   Component Value Date    LDLCALC 99 05/10/2018    CREATININE 0.7 05/10/2018       ASSESSMENT/PLAN:     Diagnosis Orders   1. Herpes zoster without complication     2. Post zoster neuralgia     3. Neuralgia     4. Thoracic radiculopathy       Valtrex given for shingles. Also recommend to increase gabapentin to 300 mg twice a day. Patient is agreeable. Side effects medication reviewed with patient. If not improved or worsening she will call for reevaluation. Problem list reviewed andsimplified/updated  HM reviewed today and counseled as appropriate    Call or go to ED immediately if symptoms worsen or persist.  Future Appointments   Date Time Provider Minerva Falcon   7/6/2021  1:30 PM [address], DO [facility]     Or sooner if necessary.       Educational materials and/or homeexercises printed for

## 2020-08-21 ENCOUNTER — HOSPITAL ENCOUNTER (OUTPATIENT)
Dept: MAMMOGRAPHY | Age: 45
Discharge: HOME | End: 2020-08-23
Payer: COMMERCIAL

## 2020-08-21 PROCEDURE — 77063 BREAST TOMOSYNTHESIS BI: CPT

## 2020-08-26 RX ORDER — TRIAMCINOLONE ACETONIDE 1 MG/G
CREAM TOPICAL
Qty: 45 G | Refills: 0 | Status: SHIPPED
Start: 2020-08-26 | End: 2020-12-02 | Stop reason: SDUPTHER

## 2020-10-04 ENCOUNTER — PATIENT MESSAGE (OUTPATIENT)
Dept: FAMILY MEDICINE CLINIC | Age: 45
End: 2020-10-04

## 2020-10-05 ENCOUNTER — PATIENT MESSAGE (OUTPATIENT)
Dept: FAMILY MEDICINE CLINIC | Age: 45
End: 2020-10-05

## 2020-10-05 NOTE — TELEPHONE ENCOUNTER
From: Heena Meadows  To: Vivian Croft DO  Sent: 10/4/2020 3:57 PM EDT  Subject: Non-Urgent Medical Question    That itchy spot came back that you said was shingles. Also we talked about increasing gabapentin but we never figured out to what.  What should I increase to?

## 2020-10-05 NOTE — TELEPHONE ENCOUNTER
From: Ivis David  To: Kinza Rahman DO  Sent: 10/5/2020 1:24 PM EDT  Subject: Non-Urgent Medical Question    I am taking 300 twice a day. Also what about the itchy spot coming back?      ----- Message -----   From:Veronica Green DO   Sent:10/5/2020 12:30 PM EDT   To:Shannen Eaton   Subject:RE: Non-Urgent Medical Question    Are you currently taking 600 twice a day?      ----- Message -----   From:Shannen Eaton   Sent:10/4/2020 3:57 PM EDT   To:Veronica Green DO   Subject:Non-Urgent Medical Question    That itchy spot came back that you said was shingles. Also we talked about increasing gabapentin but we never figured out to what.  What should I increase to?

## 2020-10-05 NOTE — TELEPHONE ENCOUNTER
Pt left a message stating she needs a new script for gabapentin as you had increased to 3 times daily , please send to pharmacy also she said the itchy spot that she had and said it may be shingles has come back and it is very itchy she is asking if you can call something in for it .  Please advise

## 2020-10-06 RX ORDER — VALACYCLOVIR HYDROCHLORIDE 1 G/1
1000 TABLET, FILM COATED ORAL 3 TIMES DAILY
Qty: 21 TABLET | Refills: 0 | Status: SHIPPED
Start: 2020-10-06 | End: 2020-11-30

## 2020-10-06 RX ORDER — GABAPENTIN 300 MG/1
CAPSULE ORAL
Qty: 90 CAPSULE | Refills: 3 | Status: SHIPPED
Start: 2020-10-06 | End: 2021-03-02

## 2020-10-21 ENCOUNTER — NURSE ONLY (OUTPATIENT)
Dept: FAMILY MEDICINE CLINIC | Age: 45
End: 2020-10-21
Payer: COMMERCIAL

## 2020-10-21 PROCEDURE — 90686 IIV4 VACC NO PRSV 0.5 ML IM: CPT | Performed by: FAMILY MEDICINE

## 2020-10-21 PROCEDURE — 90471 IMMUNIZATION ADMIN: CPT | Performed by: FAMILY MEDICINE

## 2020-10-27 ENCOUNTER — PATIENT MESSAGE (OUTPATIENT)
Dept: FAMILY MEDICINE CLINIC | Age: 45
End: 2020-10-27

## 2020-10-27 NOTE — TELEPHONE ENCOUNTER
From: Ade Ford  To: Kip 5, DO  Sent: 10/27/2020 4:10 PM EDT  Subject: Visit Follow-Up Question    When I was there a couple concerns I had mentioned was headaches and possibly my pills not working very well. I have tried drinking more water and I'm still getting a lot of headaches. Also I've had a day without the kids and I am still very frustrated. I think I would like to change. Is there something else that does not have sexual side effects like the one I am on. The Paxil did have sexual side effects.

## 2020-10-29 ENCOUNTER — PATIENT MESSAGE (OUTPATIENT)
Dept: FAMILY MEDICINE CLINIC | Age: 45
End: 2020-10-29

## 2020-10-29 NOTE — TELEPHONE ENCOUNTER
From: Ely Islas  To: Kip 5, DO  Sent: 10/29/2020 8:16 AM EDT  Subject: Prescription Question    OK, I will try the Wellbutrin again then. I definitely don't want anything that has the sexual side effects and I don't want anything that's going to be too crazy. I just know now I'm agitated and frustrated all the time. I feel like I have less tense pit in my stomach all the time. I've had it before and we've increased this med but it just seems to still be there all the time. I've just stayed on this one because I haven't had the sexual side effects. How do I go off this one and start the other one?

## 2020-11-02 RX ORDER — BUPROPION HYDROCHLORIDE 150 MG/1
150 TABLET ORAL EVERY MORNING
Qty: 30 TABLET | Refills: 3 | Status: SHIPPED
Start: 2020-11-02 | End: 2020-12-02

## 2020-11-10 ENCOUNTER — PATIENT MESSAGE (OUTPATIENT)
Dept: FAMILY MEDICINE CLINIC | Age: 45
End: 2020-11-10

## 2020-11-10 RX ORDER — VENLAFAXINE HYDROCHLORIDE 75 MG/1
75 CAPSULE, EXTENDED RELEASE ORAL DAILY
Qty: 14 CAPSULE | Refills: 0 | Status: SHIPPED
Start: 2020-11-10 | End: 2020-12-02 | Stop reason: ALTCHOICE

## 2020-11-10 NOTE — TELEPHONE ENCOUNTER
From: Zhane Scott  To: Blaire Knowles DO  Sent: 11/10/2020 8:51 AM EST  Subject: Prescription Question    I have gone off the Effexor and onto the Wellbutrin. I am having a really tough time. I'm so emotional I keep getting into these crying fits that I can't stop several times a day. I'm not a person that cries ever so this is been really hard. I'm sure you hate people that look on the Internet but I found a post where a few people said they used tramadol to get over the withdrawal. It's been really hard. I am going out of town tonight until Sunday. So if do you have any suggestions or if I can try that can we get it set up today?

## 2020-11-30 RX ORDER — VALACYCLOVIR HYDROCHLORIDE 1 G/1
TABLET, FILM COATED ORAL
Qty: 21 TABLET | Refills: 0 | Status: SHIPPED
Start: 2020-11-30 | End: 2020-12-03

## 2020-12-02 ENCOUNTER — VIRTUAL VISIT (OUTPATIENT)
Dept: FAMILY MEDICINE CLINIC | Age: 45
End: 2020-12-02
Payer: COMMERCIAL

## 2020-12-02 PROCEDURE — 99214 OFFICE O/P EST MOD 30 MIN: CPT | Performed by: FAMILY MEDICINE

## 2020-12-02 PROCEDURE — G8427 DOCREV CUR MEDS BY ELIG CLIN: HCPCS | Performed by: FAMILY MEDICINE

## 2020-12-02 RX ORDER — TRIAMCINOLONE ACETONIDE 1 MG/G
CREAM TOPICAL
Qty: 45 G | Refills: 0 | Status: SHIPPED | OUTPATIENT
Start: 2020-12-02 | End: 2022-07-15 | Stop reason: SDUPTHER

## 2020-12-02 RX ORDER — BUPROPION HYDROCHLORIDE 150 MG/1
150 TABLET ORAL 2 TIMES DAILY
Qty: 60 TABLET | Refills: 3 | Status: SHIPPED
Start: 2020-12-02 | End: 2021-07-08

## 2020-12-02 RX ORDER — LEVOTHYROXINE SODIUM 0.05 MG/1
TABLET ORAL
Qty: 90 TABLET | Refills: 1 | Status: SHIPPED
Start: 2020-12-02 | End: 2021-03-30 | Stop reason: SDUPTHER

## 2020-12-02 ASSESSMENT — ENCOUNTER SYMPTOMS
COUGH: 0
CONSTIPATION: 0
SHORTNESS OF BREATH: 0
SINUS PRESSURE: 0
DIARRHEA: 0
ABDOMINAL PAIN: 0
BACK PAIN: 0
SORE THROAT: 0
EYE PAIN: 0

## 2020-12-02 NOTE — PROGRESS NOTES
This visit was performed as a virtual video visit using a synchronous, two-way, audio-video telehealth technology platform. Patient identification was verified at the start of the visit, including the patient's telephone number and physical location. I discussed with the patient the nature of our telehealth visits, that:     1. Due to the nature of an audio- video modality, the only components of a physical exam that could be done are the elements supported by direct observation. 2. I would evaluate the patient and recommend diagnostics and treatments based on my assessment. 3. If it was felt that the patient should be evaluated in clinic or an emergency room setting, then they would be directed there. 4. Our sessions are not being recorded and that personal health information is protected. 5. Our team would provide follow up care in person if/when the patient needs it. Patient does agree to proceed with telemedicine consultation. Patient's location: home address in [facility]    Physician  location [place] [address] office   other people involved in call: none        Time spent: Greater than Not billed by time    This visit was completed virtually using [employer]. austin Elmore  : 1975    Chief Complaint:     Chief Complaint   Patient presents with    Rash     urgent care follow up. shingles outbreak again. HPI  Shannen Eaton 39 y.o. presents for   Chief Complaint   Patient presents with    Rash     urgent care follow up. shingles outbreak again. She felt a spot that started to itch. She went to urgent care Friday. She was given valtrex. She started taking it friday night and it did get worse before it got better. The spot is still present but has not gone away completely. She is not sure if she needs something daily. She has had multiple outbreaks of this. She also has some agitation despite being on the wellbutrin. She has stopped the effexor. Her feet have been itchy lately.  These occurs randomly. The top or side itch. She has some issues with leg movements at night. All questions were answered to patients satisfaction.     Past Medical History:   Diagnosis Date    Acne     Anxiety     Dyslipidemia     History of tobacco use     Hypothyroidism     hx  no tx at present    Insomnia     Low back pain, episodic     Outbursts of anger        Past Surgical History:   Procedure Laterality Date     SECTION  2008     SECTION  2012    TUBAL LIGATION  2012       Social History     Socioeconomic History    Marital status:      Spouse name: None    Number of children: None    Years of education: None    Highest education level: None   Occupational History    Occupation: self-employed   Social Needs    Financial resource strain: None    Food insecurity     Worry: None     Inability: None    Transportation needs     Medical: None     Non-medical: None   Tobacco Use    Smoking status: Former Smoker     Packs/day: 0.25     Years: 5.00     Pack years: 1.25     Last attempt to quit: 2009     Years since quittin.3    Smokeless tobacco: Never Used    Tobacco comment: I smoked for 12 years then quit, then smoked occasionally until I quit 2009   Substance and Sexual Activity    Alcohol use: Yes     Comment: very rarely, 1 drink maybe one or twice a month    Drug use: No    Sexual activity: Yes     Partners: Male   Lifestyle    Physical activity     Days per week: None     Minutes per session: None    Stress: None   Relationships    Social connections     Talks on phone: None     Gets together: None     Attends [employer] service: None     Active member of club or organization: None     Attends meetings of clubs or organizations: None     Relationship status: None    Intimate partner violence     Fear of current or ex partner: None     Emotionally abused: None     Physically abused: None     Forced sexual activity: None   Other Topics Concern    None   Social History Narrative    None       History reviewed. No pertinent family history. Current Outpatient Medications   Medication Sig Dispense Refill    levothyroxine (SYNTHROID) 50 MCG tablet take 1 tablet by mouth once daily 90 tablet 1    triamcinolone (KENALOG) 0.1 % cream apply to affected area twice a day 45 g 0    buPROPion (WELLBUTRIN XL) 150 MG extended release tablet Take 1 tablet by mouth 2 times daily 60 tablet 3    valACYclovir (VALTREX) 1 g tablet take 1 tablet by mouth three times a day for 7 days 21 tablet 0    gabapentin (NEURONTIN) 300 MG capsule take 1 capsule by mouth three times a day 90 capsule 3    aluminum chloride (DRYSOL) 20 % external solution Apply topically nightly. 1 Bottle 2    Multiple Vitamin (MULTI-VITAMIN PO) Take  by mouth. No current facility-administered medications for this visit. No Known Allergies    There are no preventive care reminders to display for this patient. REVIEW OF SYSTEMS  Review of Systems   Constitutional: Negative for fatigue and fever. HENT: Negative for ear pain, sinus pressure, sneezing and sore throat. Eyes: Negative for pain. Respiratory: Negative for cough and shortness of breath. Cardiovascular: Negative for chest pain and leg swelling. Gastrointestinal: Negative for abdominal pain, constipation and diarrhea. Genitourinary: Negative for dysuria and urgency. Musculoskeletal: Negative for back pain and myalgias. Restless leg   Skin: Positive for rash. Allergic/Immunologic: Negative for food allergies. Neurological: Negative for light-headedness and headaches. Hematological: Does not bruise/bleed easily. Psychiatric/Behavioral: Positive for agitation. Negative for behavioral problems and sleep disturbance. The patient is not nervous/anxious. PHYSICAL EXAM  There were no vitals taken for this visit. Physical Exam  Vitals signs reviewed.    Constitutional: Appearance: Normal appearance. She is normal weight. HENT:      Head: Normocephalic. Nose: Nose normal.   Eyes:      Extraocular Movements: Extraocular movements intact. Conjunctiva/sclera: Conjunctivae normal.   Neck:      Musculoskeletal: Normal range of motion. Pulmonary:      Effort: Pulmonary effort is normal.   Musculoskeletal: Normal range of motion. Skin:     Findings: No rash. Neurological:      General: No focal deficit present. Mental Status: She is alert and oriented to person, place, and time. Mental status is at baseline. Psychiatric:         Mood and Affect: Mood normal.         Behavior: Behavior normal.         Thought Content: Thought content normal.         Judgment: Judgment normal.              Laboratory: All laboratory and radiology results have been personally reviewed by myself    Lab Results   Component Value Date     05/10/2018    K 4.3 05/10/2018     05/10/2018    CO2 22 05/10/2018    BUN 14 05/10/2018    CREATININE 0.7 05/10/2018    PROT 6.9 05/10/2018    LABALBU 4.2 05/10/2018    CALCIUM 9.0 05/10/2018    GFRAA >60 05/10/2018    LABGLOM >60 05/10/2018    GLUCOSE 85 05/10/2018    AST 19 05/10/2018    ALT 13 05/10/2018    ALKPHOS 42 05/10/2018    BILITOT 0.6 05/10/2018    TSH 1.420 07/02/2020    VITD25 42 06/26/2019    CHOL 175 05/10/2018    TRIG 103 05/10/2018    HDL 55 05/10/2018    LDLCALC 99 05/10/2018        Lab Results   Component Value Date    CHOL 175 05/10/2018    CHOL 171 10/04/2013     Lab Results   Component Value Date    TRIG 103 05/10/2018    TRIG 83 10/04/2013     Lab Results   Component Value Date    HDL 55 05/10/2018    HDL 54.0 10/04/2013     Lab Results   Component Value Date    LDLCALC 99 05/10/2018    LDLCALC 100 (H) 10/04/2013       No results found for: LABA1C  Lab Results   Component Value Date    LDLCALC 99 05/10/2018    CREATININE 0.7 05/10/2018       ASSESSMENT/PLAN:     Diagnosis Orders   1.  Herpes zoster without complication  HERPES SIMPLEX VIRUS (HSV) I/II ANTIBODIES IGG & IGM W/ REFLEX    VARICELLA ZOSTER ANTIBODY, IGG    VARICELLA ZOSTER ANTIBODY, IGM   2. Acquired hypothyroidism  levothyroxine (SYNTHROID) 50 MCG tablet   3. Anxiety     4. Restless leg syndrome  Iron and TIBC   5. Eczema, unspecified type       Patient likely needs suppression therapy Valtrex was given to use on a daily basis to prevent these episodes from happening so often. Labs to be completed as well. Will increase Wellbutrin to 150 twice a day for agitation. As for restless leg will check iron level before starting medication. Patient is agreeable. Synthroid reordered today. Triamcinolone cream sent in to use as needed as well    Problem list reviewed andsimplified/updated  HM reviewed today and counseled as appropriate    Call or go to ED immediately if symptoms worsen or persist.  Future Appointments   Date Time Provider Minerva Falcon   7/6/2021  1:30 PM DO Stevo Goff [facility]     Or sooner if necessary. Educational materials and/or homeexercises printed for patient's review and were included in patient instructions on his/her After Visit Summary and given to patient at the end of visit. Counseled regarding above diagnosis, including possible risks and complications,  especially if left uncontrolled. Counseled regarding the possible side effects, risks, benefits and alternatives to treatment; patient and/or guardian verbalizes understanding, agrees,feels comfortable with and wishes to proceed with above treatment plan. Advised patient to call [facility] Odom new medication issues, and read all Rx info from pharmacy to assure aware of all possible risks and side effects of medication before taking. Reviewed age and gender appropriate health screening exams and vaccinations.   Advised patient regarding importance of keeping up with recommended health maintenance and toschedule as soon as possible if overdue, as this is important in assessing for undiagnosed pathology, especially cancer, as well as protecting against potentially harmful/life threatening disease. Patient and/or guardian verbalizes understanding and agrees with above counseling, assessment and plan. All questions answered. Garciadiannesukh ShayDO  12/2/20    NOTE: This report was transcribed using voice recognition software. Every effort was made to ensure accuracy; however, inadvertent computerized transcription errors may be present    Rhiannon Almodovar is a 39 y.o. female being evaluated by a Virtual Visit (video visit) encounter to address concerns as mentioned above. A caregiver was present when appropriate. Due to this being a TeleHealth encounter (During [place] [address] public [facility] emergency), evaluation of the following organ systems was limited: Vitals/Constitutional/EENT/Resp/CV/GI//MS/Neuro/Skin/Heme-Lymph-Imm. Pursuant to the emergency declaration under the [facility], [address] [place] [address] authority and the [employer] Act, this Virtual Visit was conducted with patient's (and/or legal guardian's) consent, to reduce the patient's risk of exposure to COVID-19 and provide necessary medical care. The patient (and/or legal guardian) has also been advised to contact this office for worsening conditions or problems, and seek emergency medical treatment and/or call 911 if deemed necessary.

## 2020-12-03 RX ORDER — VALACYCLOVIR HYDROCHLORIDE 1 G/1
1000 TABLET, FILM COATED ORAL DAILY
Qty: 30 TABLET | Refills: 5 | Status: SHIPPED | OUTPATIENT
Start: 2020-12-03

## 2020-12-14 ENCOUNTER — PATIENT MESSAGE (OUTPATIENT)
Dept: FAMILY MEDICINE CLINIC | Age: 45
End: 2020-12-14

## 2020-12-14 ENCOUNTER — HOSPITAL ENCOUNTER (OUTPATIENT)
Age: 45
Discharge: HOME | End: 2020-12-14
Payer: COMMERCIAL

## 2020-12-14 LAB
IRON SATURATION: 32 % (ref 15–50)
IRON: 101 MCG/DL (ref 37–145)
TOTAL IRON BINDING CAPACITY: 316 MCG/DL (ref 250–450)

## 2020-12-14 PROCEDURE — 36415 COLL VENOUS BLD VENIPUNCTURE: CPT

## 2020-12-14 PROCEDURE — 83550 IRON BINDING TEST: CPT

## 2020-12-14 PROCEDURE — 86694 HERPES SIMPLEX NES ANTBDY: CPT

## 2020-12-14 PROCEDURE — 86787 VARICELLA-ZOSTER ANTIBODY: CPT

## 2020-12-14 PROCEDURE — 86695 HERPES SIMPLEX TYPE 1 TEST: CPT

## 2020-12-14 PROCEDURE — 86696 HERPES SIMPLEX TYPE 2 TEST: CPT

## 2020-12-14 PROCEDURE — 83540 ASSAY OF IRON: CPT

## 2020-12-14 NOTE — TELEPHONE ENCOUNTER
From: Keivn Marley  To: Jaqueline Cramer DO  Sent: 12/14/2020 3:05 PM EST  Subject: Prescription Question    I increased the Wellbutrin on December 3 and I wanted to report that I am still extremely agitated all the time. I feel like it is not helping at all.

## 2020-12-18 LAB — VARICELLA ZOSTER AB IGM: 0.13 ISR

## 2020-12-19 ENCOUNTER — PATIENT MESSAGE (OUTPATIENT)
Dept: FAMILY MEDICINE CLINIC | Age: 45
End: 2020-12-19

## 2020-12-19 LAB
HERPES TYPE 1/2 IGM COMBINED: 1.42 IV
HERPES TYPE I/II IGG COMBINED: 14.2 IV
HSV 1 GLYCOPROTEIN G AB IGG: 1.34 IV
HSV 2 GLYCOPROTEIN G AB IGG: >23.6 IV

## 2020-12-21 NOTE — TELEPHONE ENCOUNTER
From: Hong Loco  To: Byron Brady DO  Sent: 12/19/2020 11:38 AM EST  Subject: Test Results Question    I have a question about HERPES SIMPLEX VIRUS (HSV) I/II ANTIBODIES IGG & IGM W/ REFLEX resulted on 12/19/20, 9:21 AM.    What does this mean?

## 2021-01-05 RX ORDER — ACYCLOVIR 400 MG/1
400 TABLET ORAL 2 TIMES DAILY
Qty: 60 TABLET | Refills: 2 | Status: SHIPPED
Start: 2021-01-05 | End: 2021-03-31

## 2021-03-02 RX ORDER — GABAPENTIN 300 MG/1
CAPSULE ORAL
Qty: 90 CAPSULE | Refills: 3 | Status: SHIPPED
Start: 2021-03-02 | End: 2021-08-24

## 2021-03-30 DIAGNOSIS — E03.9 ACQUIRED HYPOTHYROIDISM: ICD-10-CM

## 2021-03-31 RX ORDER — LEVOTHYROXINE SODIUM 0.05 MG/1
TABLET ORAL
Qty: 90 TABLET | Refills: 1 | Status: SHIPPED
Start: 2021-03-31 | End: 2022-02-01

## 2021-03-31 RX ORDER — ACYCLOVIR 400 MG/1
TABLET ORAL
Qty: 60 TABLET | Refills: 2 | Status: SHIPPED
Start: 2021-03-31 | End: 2021-06-29

## 2021-05-30 ENCOUNTER — PATIENT MESSAGE (OUTPATIENT)
Dept: FAMILY MEDICINE CLINIC | Age: 46
End: 2021-05-30

## 2021-05-30 DIAGNOSIS — B02.9 HERPES ZOSTER WITHOUT COMPLICATION: Primary | ICD-10-CM

## 2021-06-01 NOTE — TELEPHONE ENCOUNTER
From: Tristan Chou  To: Kip 5, DO  Sent: 5/30/2021 11:10 AM EDT  Subject: Non-Urgent Medical Question    Yesterday I noticed another outbreak of herpies in the same spot as always on my left butt cheek. I am on that acyclovir 2 times a day. I missed 1 night dose 2 days ago and I got an outbreak. Is that normal to be that sensitive? What do I do now?

## 2021-06-29 RX ORDER — ACYCLOVIR 400 MG/1
TABLET ORAL
Qty: 60 TABLET | Refills: 2 | Status: SHIPPED
Start: 2021-06-29 | End: 2021-06-29 | Stop reason: SDUPTHER

## 2021-06-30 RX ORDER — ACYCLOVIR 400 MG/1
400 TABLET ORAL 2 TIMES DAILY
Qty: 60 TABLET | Refills: 2 | Status: SHIPPED
Start: 2021-06-30 | End: 2021-11-30

## 2021-07-06 ENCOUNTER — PATIENT MESSAGE (OUTPATIENT)
Dept: FAMILY MEDICINE CLINIC | Age: 46
End: 2021-07-06

## 2021-07-06 RX ORDER — SCOLOPAMINE TRANSDERMAL SYSTEM 1 MG/1
1 PATCH, EXTENDED RELEASE TRANSDERMAL
Qty: 5 PATCH | Refills: 1 | Status: SHIPPED | OUTPATIENT
Start: 2021-07-06 | End: 2022-07-07

## 2021-07-06 NOTE — TELEPHONE ENCOUNTER
From: Mohsengerda Kaci  To: Kip 5, DO  Sent: 7/6/2021 1:06 PM EDT  Subject: Prescription Question    I'm going to be going on a cruise in less than two weeks and I need a prescription for the transdermal patch. can she call that into the pharmacy for me as soon as possible?  Thank you

## 2021-07-08 ENCOUNTER — OFFICE VISIT (OUTPATIENT)
Dept: FAMILY MEDICINE CLINIC | Age: 46
End: 2021-07-08
Payer: COMMERCIAL

## 2021-07-08 VITALS
TEMPERATURE: 98.1 F | HEIGHT: 64 IN | BODY MASS INDEX: 30.05 KG/M2 | WEIGHT: 176 LBS | SYSTOLIC BLOOD PRESSURE: 125 MMHG | RESPIRATION RATE: 18 BRPM | HEART RATE: 84 BPM | DIASTOLIC BLOOD PRESSURE: 89 MMHG

## 2021-07-08 DIAGNOSIS — E55.9 VITAMIN D DEFICIENCY: ICD-10-CM

## 2021-07-08 DIAGNOSIS — E03.9 ACQUIRED HYPOTHYROIDISM: Primary | ICD-10-CM

## 2021-07-08 DIAGNOSIS — E78.5 DYSLIPIDEMIA: ICD-10-CM

## 2021-07-08 DIAGNOSIS — E03.9 ACQUIRED HYPOTHYROIDISM: ICD-10-CM

## 2021-07-08 LAB
ALBUMIN SERPL-MCNC: 4.3 G/DL (ref 3.5–5.2)
ALP BLD-CCNC: 54 U/L (ref 35–104)
ALT SERPL-CCNC: 20 U/L (ref 0–32)
ANION GAP SERPL CALCULATED.3IONS-SCNC: 12 MMOL/L (ref 7–16)
AST SERPL-CCNC: 16 U/L (ref 0–31)
BILIRUB SERPL-MCNC: 0.3 MG/DL (ref 0–1.2)
BUN BLDV-MCNC: 8 MG/DL (ref 6–20)
CALCIUM SERPL-MCNC: 9.3 MG/DL (ref 8.6–10.2)
CHLORIDE BLD-SCNC: 102 MMOL/L (ref 98–107)
CHOLESTEROL, TOTAL: 213 MG/DL (ref 0–199)
CO2: 22 MMOL/L (ref 22–29)
CREAT SERPL-MCNC: 0.9 MG/DL (ref 0.5–1)
GFR AFRICAN AMERICAN: >60
GFR NON-AFRICAN AMERICAN: >60 ML/MIN/1.73
GLUCOSE BLD-MCNC: 89 MG/DL (ref 74–99)
HDLC SERPL-MCNC: 54 MG/DL
LDL CHOLESTEROL CALCULATED: 136 MG/DL (ref 0–99)
POTASSIUM SERPL-SCNC: 4.4 MMOL/L (ref 3.5–5)
SODIUM BLD-SCNC: 136 MMOL/L (ref 132–146)
TOTAL PROTEIN: 7.4 G/DL (ref 6.4–8.3)
TRIGL SERPL-MCNC: 115 MG/DL (ref 0–149)
TSH SERPL DL<=0.05 MIU/L-ACNC: 2 UIU/ML (ref 0.27–4.2)
VITAMIN D 25-HYDROXY: 37 NG/ML (ref 30–100)
VLDLC SERPL CALC-MCNC: 23 MG/DL

## 2021-07-08 PROCEDURE — 99214 OFFICE O/P EST MOD 30 MIN: CPT | Performed by: FAMILY MEDICINE

## 2021-07-08 PROCEDURE — G8417 CALC BMI ABV UP PARAM F/U: HCPCS | Performed by: FAMILY MEDICINE

## 2021-07-08 PROCEDURE — 1036F TOBACCO NON-USER: CPT | Performed by: FAMILY MEDICINE

## 2021-07-08 PROCEDURE — G8427 DOCREV CUR MEDS BY ELIG CLIN: HCPCS | Performed by: FAMILY MEDICINE

## 2021-07-08 SDOH — ECONOMIC STABILITY: FOOD INSECURITY: WITHIN THE PAST 12 MONTHS, YOU WORRIED THAT YOUR FOOD WOULD RUN OUT BEFORE YOU GOT MONEY TO BUY MORE.: NEVER TRUE

## 2021-07-08 SDOH — ECONOMIC STABILITY: FOOD INSECURITY: WITHIN THE PAST 12 MONTHS, THE FOOD YOU BOUGHT JUST DIDN'T LAST AND YOU DIDN'T HAVE MONEY TO GET MORE.: NEVER TRUE

## 2021-07-08 ASSESSMENT — ENCOUNTER SYMPTOMS
SORE THROAT: 0
SHORTNESS OF BREATH: 0
CONSTIPATION: 0
BACK PAIN: 0
DIARRHEA: 0
SINUS PRESSURE: 0
EYE PAIN: 0
ABDOMINAL PAIN: 0
COUGH: 0

## 2021-07-08 ASSESSMENT — PATIENT HEALTH QUESTIONNAIRE - PHQ9
SUM OF ALL RESPONSES TO PHQ QUESTIONS 1-9: 0
SUM OF ALL RESPONSES TO PHQ9 QUESTIONS 1 & 2: 0
2. FEELING DOWN, DEPRESSED OR HOPELESS: 0
SUM OF ALL RESPONSES TO PHQ QUESTIONS 1-9: 0
SUM OF ALL RESPONSES TO PHQ QUESTIONS 1-9: 0
1. LITTLE INTEREST OR PLEASURE IN DOING THINGS: 0

## 2021-07-08 ASSESSMENT — SOCIAL DETERMINANTS OF HEALTH (SDOH): HOW HARD IS IT FOR YOU TO PAY FOR THE VERY BASICS LIKE FOOD, HOUSING, MEDICAL CARE, AND HEATING?: NOT HARD AT ALL

## 2021-07-08 NOTE — PROGRESS NOTES
[facility]  : 1975    Chief Complaint:     Chief Complaint   Patient presents with    Annual Exam       HPI  [facility] 39 y.o. presents for   Chief Complaint   Patient presents with    Annual Exam     Is for follow-up today. She states she has been doing well overall. She is in need of blood work. She states she did fast today. She does have a history of hypothyroidism currently on medication. She does have a history of dyslipidemia not currently on medication. She is due for diabetes screen. All questions were answered to patients satisfaction.     Past Medical History:   Diagnosis Date    Acne     Anxiety     Dyslipidemia     History of tobacco use     Hypothyroidism     hx  no tx at present    Insomnia     Low back pain, episodic     Outbursts of anger        Past Surgical History:   Procedure Laterality Date     SECTION  2008     SECTION  2012    TUBAL LIGATION  2012       Social History     Socioeconomic History    Marital status:      Spouse name: None    Number of children: None    Years of education: None    Highest education level: None   Occupational History    Occupation: self-employed   Tobacco Use    Smoking status: Former Smoker     Packs/day: 0.25     Years: 5.00     Pack years: 1.25     Quit date: 2009     Years since quittin.9    Smokeless tobacco: Never Used    Tobacco comment: I smoked for 12 years then quit, then smoked occasionally until I quit 2009   Substance and Sexual Activity    Alcohol use: Yes     Comment: very rarely, 1 drink maybe one or twice a month    Drug use: No    Sexual activity: Yes     Partners: Male   Other Topics Concern    None   Social History Narrative    None     Social Determinants of Health     Financial Resource Strain: Low Risk     Difficulty of Paying Living Expenses: Not hard at all   Food Insecurity: No Food Insecurity    Worried About Running Out of Food in the Last Year: Never true    [address] in the Last Year: Never true   Transportation Needs:     Lack of Transportation (Medical):  Lack of Transportation (Non-Medical):    Physical Activity:     Days of Exercise per Week:     Minutes of Exercise per Session:    Stress:     Feeling of Stress :    Social Connections:     Frequency of Communication with Friends and Family:     Frequency of Social Gatherings with Friends and Family:     Attends Confucianism Services:     Active Member of Clubs or Organizations:     Attends Club or Organization Meetings:     Marital Status:    Intimate Partner Violence:     Fear of Current or Ex-Partner:     Emotionally Abused:     Physically Abused:     Sexually Abused:        No family history on file. Current Outpatient Medications   Medication Sig Dispense Refill    scopolamine (TRANSDERM-SCOP, 1.5 MG,) transdermal patch Place 1 patch onto the skin every 72 hours 5 patch 1    acyclovir (ZOVIRAX) 400 MG tablet Take 1 tablet by mouth 2 times daily 60 tablet 2    levothyroxine (SYNTHROID) 50 MCG tablet take 1 tablet by mouth once daily 90 tablet 1    valACYclovir (VALTREX) 1 g tablet Take 1 tablet by mouth daily 30 tablet 5    triamcinolone (KENALOG) 0.1 % cream apply to affected area twice a day 45 g 0    aluminum chloride (DRYSOL) 20 % external solution Apply topically nightly. 1 Bottle 2    Multiple Vitamin (MULTI-VITAMIN PO) Take  by mouth.  gabapentin (NEURONTIN) 300 MG capsule take 1 capsule by mouth three times a day 90 capsule 3     No current facility-administered medications for this visit. No Known Allergies    Health Maintenance Due   Topic Date Due    Hepatitis C screen  Never done    Diabetes screen  Never done    Colon cancer screen colonoscopy  Never done    TSH testing  07/02/2021           REVIEW OF SYSTEMS  Review of Systems   Constitutional: Negative for fatigue and fever.    HENT: Negative for ear pain, sinus pressure, sneezing and sore throat. Eyes: Negative for pain. Respiratory: Negative for cough and shortness of breath. Cardiovascular: Negative for chest pain and leg swelling. Gastrointestinal: Negative for abdominal pain, constipation and diarrhea. Genitourinary: Negative for dysuria and urgency. Musculoskeletal: Negative for back pain and myalgias. Skin: Negative for rash. Allergic/Immunologic: Negative for food allergies. Neurological: Negative for light-headedness and headaches. Hematological: Does not bruise/bleed easily. Psychiatric/Behavioral: Negative for behavioral problems and sleep disturbance. PHYSICAL EXAM  /89   Pulse 84   Temp 98.1 °F (36.7 °C)   Resp 18   Ht 5' 4\" (1.626 m)   Wt 176 lb (79.8 kg)   LMP 06/21/2021   BMI 30.21 kg/m²   Physical Exam  Vitals and nursing note reviewed. Constitutional:       Appearance: She is well-developed. HENT:      Head: Normocephalic and atraumatic. Right Ear: External ear normal.      Left Ear: External ear normal.      Nose: Nose normal.   Eyes:      Conjunctiva/sclera: Conjunctivae normal.   Neck:      Thyroid: No thyromegaly. Cardiovascular:      Rate and Rhythm: Normal rate and regular rhythm. Heart sounds: Normal heart sounds. No murmur heard. Pulmonary:      Effort: Pulmonary effort is normal.      Breath sounds: Normal breath sounds. No wheezing. Abdominal:      Palpations: Abdomen is soft. Tenderness: There is no abdominal tenderness. Musculoskeletal:         General: No tenderness. Normal range of motion. Cervical back: Normal range of motion and neck supple. Lymphadenopathy:      Cervical: No cervical adenopathy. Skin:     General: Skin is warm and dry. Findings: No rash. Neurological:      Mental Status: She is alert and oriented to person, place, and time. Deep Tendon Reflexes: Reflexes are normal and symmetric.    Psychiatric:         Behavior: Behavior normal. Laboratory: All laboratory and radiology results have been personally reviewed by myself    Lab Results   Component Value Date     05/10/2018    K 4.3 05/10/2018     05/10/2018    CO2 22 05/10/2018    BUN 14 05/10/2018    CREATININE 0.7 05/10/2018    PROT 6.9 05/10/2018    LABALBU 4.2 05/10/2018    CALCIUM 9.0 05/10/2018    GFRAA >60 05/10/2018    LABGLOM >60 05/10/2018    GLUCOSE 85 05/10/2018    AST 19 05/10/2018    ALT 13 05/10/2018    ALKPHOS 42 05/10/2018    BILITOT 0.6 05/10/2018    TSH 1.420 07/02/2020    VITD25 42 06/26/2019    CHOL 175 05/10/2018    TRIG 103 05/10/2018    HDL 55 05/10/2018    LDLCALC 99 05/10/2018        Lab Results   Component Value Date    CHOL 175 05/10/2018    CHOL 171 10/04/2013     Lab Results   Component Value Date    TRIG 103 05/10/2018    TRIG 83 10/04/2013     Lab Results   Component Value Date    HDL 55 05/10/2018    HDL 54.0 10/04/2013     Lab Results   Component Value Date    LDLCALC 99 05/10/2018    LDLCALC 100 (H) 10/04/2013       No results found for: LABA1C  Lab Results   Component Value Date    LDLCALC 99 05/10/2018    CREATININE 0.7 05/10/2018       ASSESSMENT/PLAN:     Diagnosis Orders   1. Acquired hypothyroidism  TSH without Reflex   2. Vitamin D deficiency  Vitamin D 25 Hydroxy   3. Dyslipidemia  Lipid Panel    COMPREHENSIVE METABOLIC PANEL     Labs to be completed as above. We will continue all medications at this time. No other change made at this time above currently stable unless otherwise noted    Problem list reviewed andsimplified/updated  HM reviewed today and counseled as appropriate    Call or go to ED immediately if symptoms worsen or persist.  No future appointments. Or sooner if necessary.       Educational materials and/or homeexercises printed for patient's review and were included in patient instructions on his/her After Visit Summary and given to patient at the end of visit.       Counseled regarding above diagnosis, including possible risks and complications,  especially if left uncontrolled.     Counseled regarding the possible side effects, risks, benefits and alternatives to treatment; patient and/or guardian verbalizes understanding, agrees,feels comfortable with and wishes to proceed with above treatment plan.     Advised patient to call Sharad Looney new medication issues, and read all Rx info from pharmacy to assure aware of all possible risks and side effects of medication before taking.     Reviewed age and gender appropriate health screening exams and vaccinations. Advised patient regarding importance of keeping up with recommended health maintenance and toschedule as soon as possible if overdue, as this is important in assessing for undiagnosed pathology, especially cancer, as well as protecting against potentially harmful/life threatening disease.          Patient and/or guardian verbalizes understanding and agrees with above counseling, assessment and plan.     All questions answered. Kip 5, DO  7/8/21    NOTE: This report was transcribed using voice recognition software.  Every effort was made to ensure accuracy; however, inadvertent computerized transcription errors may be present

## 2021-08-24 RX ORDER — GABAPENTIN 300 MG/1
CAPSULE ORAL
Qty: 90 CAPSULE | Refills: 3 | Status: SHIPPED
Start: 2021-08-24 | End: 2022-01-18

## 2021-10-06 ENCOUNTER — OFFICE VISIT (OUTPATIENT)
Dept: PRIMARY CARE CLINIC | Age: 46
End: 2021-10-06
Payer: COMMERCIAL

## 2021-10-06 VITALS
WEIGHT: 171.6 LBS | DIASTOLIC BLOOD PRESSURE: 79 MMHG | TEMPERATURE: 97.2 F | BODY MASS INDEX: 29.3 KG/M2 | SYSTOLIC BLOOD PRESSURE: 121 MMHG | RESPIRATION RATE: 20 BRPM | OXYGEN SATURATION: 96 % | HEIGHT: 64 IN | HEART RATE: 92 BPM

## 2021-10-06 DIAGNOSIS — R68.89 FLU-LIKE SYMPTOMS: Primary | ICD-10-CM

## 2021-10-06 DIAGNOSIS — R53.83 MALAISE AND FATIGUE: ICD-10-CM

## 2021-10-06 DIAGNOSIS — R11.0 NAUSEA: ICD-10-CM

## 2021-10-06 DIAGNOSIS — R53.81 MALAISE AND FATIGUE: ICD-10-CM

## 2021-10-06 LAB
INFLUENZA A ANTIGEN, POC: NEGATIVE
INFLUENZA B ANTIGEN, POC: NEGATIVE

## 2021-10-06 PROCEDURE — 99213 OFFICE O/P EST LOW 20 MIN: CPT | Performed by: NURSE PRACTITIONER

## 2021-10-06 PROCEDURE — G8484 FLU IMMUNIZE NO ADMIN: HCPCS | Performed by: NURSE PRACTITIONER

## 2021-10-06 PROCEDURE — G8417 CALC BMI ABV UP PARAM F/U: HCPCS | Performed by: NURSE PRACTITIONER

## 2021-10-06 PROCEDURE — G8427 DOCREV CUR MEDS BY ELIG CLIN: HCPCS | Performed by: NURSE PRACTITIONER

## 2021-10-06 PROCEDURE — 1036F TOBACCO NON-USER: CPT | Performed by: NURSE PRACTITIONER

## 2021-10-06 PROCEDURE — 87804 INFLUENZA ASSAY W/OPTIC: CPT | Performed by: NURSE PRACTITIONER

## 2021-10-06 RX ORDER — ONDANSETRON 4 MG/1
4 TABLET, ORALLY DISINTEGRATING ORAL EVERY 8 HOURS PRN
Qty: 10 TABLET | Refills: 0 | Status: SHIPPED
Start: 2021-10-06 | End: 2021-10-09

## 2021-10-06 NOTE — PROGRESS NOTES
Chief Complaint:   Generalized Body Aches (x 3 days), Fatigue, and Nausea      History of Present Illness   Source of history provided by:  patient. Jose Smyth is a 39 y.o. old female with a past medical history of:   Past Medical History:   Diagnosis Date    Acne     Anxiety     Dyslipidemia     History of tobacco use     Hypothyroidism     hx  no tx at present    Insomnia     Low back pain, episodic     Outbursts of anger          Pt presents to the [facility] care with body aches ,fatigue and nausea for the past several days. No fever noted. Denies any vomiting/diarrhea, abdominal pain, CP, progressive SOB, dizziness, or loss of smell/taste, pt denies recent or close exposure to individual w/Covid 19. Pt stated her children were sick w/similar issues, pt was traveling recently      ROS    Unless otherwise stated in this report or unable to obtain because of the patient's clinical or mental status as evidenced by the medical record, this patients's positive and negative responses for Review of Systems, constitutional, psych, eyes, ENT, cardiovascular, respiratory, gastrointestinal, neurological, genitourinary, musculoskeletal, integument systems and systems related to the presenting problem are either stated in the preceding or were not pertinent or were negative for the symptoms and/or complaints related to the medical problem. Past Surgical History:  has a past surgical history that includes  section (2008);  section (2012); and Tubal ligation (2012). Social History:  reports that she quit smoking about 12 years ago. She has a 1.25 pack-year smoking history. She has never used smokeless tobacco. She reports current alcohol use. She reports that she does not use drugs. Family History: family history is not on file. Allergies: Patient has no known allergies.     Physical Exam         VS:  /79 (Site: Right Upper Arm, Position: Sitting, Cuff Size: Medium Adult)   Pulse 92   Temp 97.2 °F (36.2 °C) (Temporal)   Resp 20   Ht 5' 4\" (1.626 m)   Wt 171 lb 9.6 oz (77.8 kg)   SpO2 96%   BMI 29.46 kg/m²    Oxygen Saturation Interpretation: Normal.    Constitutional:  Alert, development consistent with age. Ears:  External Ears: Normal bilateral pinna. TM's & External Canals: TM's normal bilaterally without perforation. Canals without erythema or drainage. Nose:   There is no obvious septal defect. Mouth:  Moist bucca mucosa and normal tongue. Throat: No posterior pharyngeal erythema, exudates or lesions. Neck:  Supple. There is no obvious adenopathy or neck tenderness. Lungs:   Breath sounds: Normal chest expansion and breath sounds noted throughout. No wheezes, rales, or rhonchi noted. Heart:  Regular rate and rhythm, normal heart sounds, without pathological murmurs, ectopy, gallops, or rubs. Skin:  Normal turgor. Warm, dry, without visible rash. Neurological:  Oriented. Motor functions intact. Lab / Imaging Results   (All laboratory and radiology results have been personally reviewed by myself)  Labs:  Results for orders placed or performed in visit on 10/06/21   POCT Influenza A/B Antigen (BD Veritor)   Result Value Ref Range    Inflenza A Ag negative     Influenza B Ag negative        Imaging: All Radiology results interpreted by Radiologist unless otherwise noted. No orders to display         Assessment / Plan     Impression(s):  1. Flu-like symptoms    2. Malaise and fatigue    3. Nausea      Disposition:  Disposition: Advised Influenza test is negative, Covid test sent to lab, Zofran for nausea      ER if changes or worse. Advised to take medication as directed.

## 2021-10-08 ENCOUNTER — PATIENT MESSAGE (OUTPATIENT)
Dept: FAMILY MEDICINE CLINIC | Age: 46
End: 2021-10-08

## 2021-10-08 DIAGNOSIS — R11.0 NAUSEA: ICD-10-CM

## 2021-10-08 NOTE — TELEPHONE ENCOUNTER
From: Faridajamison Reese  Sent: 10/8/2021 3:11 PM EDT  To: Usha Joy  Clinical Staff  Subject: RE: Test Results Question    I am not. It's just weird she said 1-2 days. I'll just wait. :-(    My chest hurts to breathe and I can't eat. Zofran helps but she only gave me 10 and I am almost out. Can I get more?    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 I know they try to run those test have a faster turnaround. But the covid send out test take 3-5 days for results. Your results are still stating in process in your chart.       ----- Message -----   From:Shannen Eaton   Sent:10/8/2021 2:06 PM EDT   To:Veronica Green DO   Subject:RE: Test Results Question    She told me 1 to 2 days. .. ----- Message -----   From:Barbara RSACON   Sent:10/8/2021 2:05 PM EDT   To:Shannen Eaton   Subject:RE: Test Results Question    Hello covid test results can take 3-5 days, please check back Monday to see if results are in       ----- Message -----   From:Shannen Eaton   Sent:10/8/2021 2:04 PM EDT   To:Veronica Green DO   Subject:Test Results Question    Can you tell me when I can expect my results? It's been a couple days.

## 2021-10-08 NOTE — TELEPHONE ENCOUNTER
Notified patient of positive results, patient is concerned because she was given zofran and its the only thing that has helped her with drinking fluids and eating a little bit of food but she will run out over the weekend.

## 2021-10-09 RX ORDER — ONDANSETRON 4 MG/1
TABLET, ORALLY DISINTEGRATING ORAL
Qty: 10 TABLET | Refills: 0 | Status: SHIPPED
Start: 2021-10-09 | End: 2021-10-11 | Stop reason: SDUPTHER

## 2021-10-11 ENCOUNTER — OFFICE VISIT (OUTPATIENT)
Dept: PRIMARY CARE CLINIC | Age: 46
End: 2021-10-11
Payer: COMMERCIAL

## 2021-10-11 VITALS
HEIGHT: 64 IN | WEIGHT: 171 LBS | HEART RATE: 110 BPM | RESPIRATION RATE: 18 BRPM | TEMPERATURE: 97.6 F | BODY MASS INDEX: 29.19 KG/M2 | OXYGEN SATURATION: 97 % | SYSTOLIC BLOOD PRESSURE: 122 MMHG | DIASTOLIC BLOOD PRESSURE: 81 MMHG

## 2021-10-11 DIAGNOSIS — R11.0 NAUSEA: ICD-10-CM

## 2021-10-11 DIAGNOSIS — U07.1 COVID-19 VIRUS INFECTION: Primary | ICD-10-CM

## 2021-10-11 DIAGNOSIS — R06.02 SHORTNESS OF BREATH: ICD-10-CM

## 2021-10-11 PROCEDURE — 99213 OFFICE O/P EST LOW 20 MIN: CPT | Performed by: NURSE PRACTITIONER

## 2021-10-11 PROCEDURE — 1036F TOBACCO NON-USER: CPT | Performed by: NURSE PRACTITIONER

## 2021-10-11 PROCEDURE — G8427 DOCREV CUR MEDS BY ELIG CLIN: HCPCS | Performed by: NURSE PRACTITIONER

## 2021-10-11 PROCEDURE — G8417 CALC BMI ABV UP PARAM F/U: HCPCS | Performed by: NURSE PRACTITIONER

## 2021-10-11 PROCEDURE — G8484 FLU IMMUNIZE NO ADMIN: HCPCS | Performed by: NURSE PRACTITIONER

## 2021-10-11 RX ORDER — VORTIOXETINE 20 MG/1
TABLET, FILM COATED ORAL
COMMUNITY
Start: 2021-09-26

## 2021-10-11 RX ORDER — HYDROXYZINE 50 MG/1
TABLET, FILM COATED ORAL
COMMUNITY
Start: 2021-07-08

## 2021-10-11 RX ORDER — ONDANSETRON 4 MG/1
TABLET, ORALLY DISINTEGRATING ORAL
Qty: 20 TABLET | Refills: 0 | Status: SHIPPED
Start: 2021-10-11 | End: 2022-07-07

## 2021-10-11 RX ORDER — DEXAMETHASONE 4 MG/1
4 TABLET ORAL 2 TIMES DAILY WITH MEALS
Qty: 10 TABLET | Refills: 0 | Status: SHIPPED | OUTPATIENT
Start: 2021-10-11 | End: 2021-10-16

## 2021-10-11 RX ORDER — PRAZOSIN HYDROCHLORIDE 1 MG/1
CAPSULE ORAL
COMMUNITY
Start: 2021-10-07

## 2021-10-11 NOTE — PROGRESS NOTES
Chief Complaint:   Positive For Covid-19, Shortness of Breath (painful breathing), and Nausea      History of Present Illness   Source of history provided by:  patient. Cari Fernández is a 55 y.o. old female with a past medical history of:   Past Medical History:   Diagnosis Date    Acne     Anxiety     Dyslipidemia     History of tobacco use     Hypothyroidism     hx  no tx at present    Insomnia     Low back pain, episodic     Outbursts of anger          Pt  presents to the [facility] care with intermittent SOB (mild) and nausea for the past few days, pt was recently dx with Covid 19 on 10/8/21. Pt stated when she takes a deep breath she does have some pain, denies fevers, chills, vomiting,diarrhea, CP, dizziness or any other concerning issues. Pulse ox 97%, pt is tolerating PO well and activity level is mildly decreased, no personal or family h/o DVT/PE      ROS    Unless otherwise stated in this report or unable to obtain because of the patient's clinical or mental status as evidenced by the medical record, this patients's positive and negative responses for Review of Systems, constitutional, psych, eyes, ENT, cardiovascular, respiratory, gastrointestinal, neurological, genitourinary, musculoskeletal, integument systems and systems related to the presenting problem are either stated in the preceding or were not pertinent or were negative for the symptoms and/or complaints related to the medical problem. Past Surgical History:  has a past surgical history that includes  section (2008);  section (2012); and Tubal ligation (2012). Social History:  reports that she quit smoking about 12 years ago. She has a 1.25 pack-year smoking history. She has never used smokeless tobacco. She reports current alcohol use. She reports that she does not use drugs. Family History: family history is not on file. Allergies: Patient has no known allergies.     Physical Exam         VS:  BP 122/81   Pulse 110   Temp 97.6 °F (36.4 °C)   Resp 18   Ht 5' 4\" (1.626 m)   Wt 171 lb (77.6 kg)   SpO2 97%   BMI 29.35 kg/m²    Oxygen Saturation Interpretation: Normal.    Constitutional:  Alert, development consistent with age. Ears:  External Ears: Normal bilateral pinna. TM's & External Canals: TM's normal bilaterally without perforation. Canals without erythema or drainage. Nose:   There is no obvious septal defect. Mouth:  Moist bucca mucosa and normal tongue. Throat: airway patent   Neck:  Supple. There is no obvious adenopathy or neck tenderness. Lungs:   Breath sounds: Normal chest expansion. Mildly decreased BS bilaterally. No coughing  Heart:  Regular rate and rhythm, normal heart sounds, without pathological murmurs, ectopy, gallops, or rubs. Skin:  Normal turgor. Warm, dry, without visible rash. Neurological:  Oriented. Motor functions intact. Lab / Imaging Results   (All laboratory and radiology results have been personally reviewed by myself)  Labs:  No results found for this visit on 10/11/21. Imaging: All Radiology results interpreted by Radiologist unless otherwise noted. No orders to display         Assessment / Plan     Impression(s):  1. COVID-19 virus infection    2. Nausea    3. Shortness of breath      Disposition:  Disposition: Chest xray now, start Decadron today, continue Vitamin D, Zinc and Vitamin C    ER if changes or worse. Advised to take medication as directed.

## 2021-10-26 ENCOUNTER — TELEPHONE (OUTPATIENT)
Dept: FAMILY MEDICINE CLINIC | Age: 46
End: 2021-10-26

## 2021-10-26 NOTE — TELEPHONE ENCOUNTER
Pt called in and is traveling on Monday 11/01/21.  Should pt come in to get covid tested since pt had covid on 10/06/21

## 2021-11-30 RX ORDER — ACYCLOVIR 400 MG/1
TABLET ORAL
Qty: 60 TABLET | Refills: 2 | Status: SHIPPED
Start: 2021-11-30 | End: 2022-03-15

## 2021-12-23 ENCOUNTER — PATIENT MESSAGE (OUTPATIENT)
Dept: FAMILY MEDICINE CLINIC | Age: 46
End: 2021-12-23

## 2021-12-23 NOTE — TELEPHONE ENCOUNTER
From: Susan Doshi  To: Dr. aWndy Montalvo: 12/23/2021 4:47 PM EST  Subject: clindamycin    Are used the clindamycin under my arms and I am out, can I have a refill on it?  Thank you very much

## 2021-12-27 RX ORDER — CLINDAMYCIN PHOSPHATE 11.9 MG/ML
SOLUTION TOPICAL
Qty: 30 ML | Refills: 1 | Status: SHIPPED | OUTPATIENT
Start: 2021-12-27 | End: 2022-01-26

## 2022-01-18 RX ORDER — GABAPENTIN 300 MG/1
CAPSULE ORAL
Qty: 90 CAPSULE | Refills: 3 | Status: SHIPPED | OUTPATIENT
Start: 2022-01-18 | End: 2022-06-08 | Stop reason: SDUPTHER

## 2022-02-01 DIAGNOSIS — E03.9 ACQUIRED HYPOTHYROIDISM: ICD-10-CM

## 2022-02-01 RX ORDER — LEVOTHYROXINE SODIUM 0.05 MG/1
TABLET ORAL
Qty: 90 TABLET | Refills: 1 | Status: SHIPPED | OUTPATIENT
Start: 2022-02-01 | End: 2022-09-20 | Stop reason: SDUPTHER

## 2022-02-25 RX ORDER — BUTALBITAL, ACETAMINOPHEN AND CAFFEINE 50; 325; 40 MG/1; MG/1; MG/1
1 TABLET ORAL EVERY 6 HOURS PRN
Qty: 30 TABLET | Refills: 0 | Status: SHIPPED
Start: 2022-02-25 | End: 2022-03-09

## 2022-03-09 ENCOUNTER — TELEPHONE (OUTPATIENT)
Dept: FAMILY MEDICINE CLINIC | Age: 47
End: 2022-03-09

## 2022-03-09 RX ORDER — BUTALBITAL, ACETAMINOPHEN AND CAFFEINE 50; 325; 40 MG/1; MG/1; MG/1
TABLET ORAL
Qty: 30 TABLET | Refills: 0 | Status: SHIPPED
Start: 2022-03-09 | End: 2022-03-09 | Stop reason: ALTCHOICE

## 2022-03-09 NOTE — TELEPHONE ENCOUNTER
Pt called stated yes medication did help last month.   Pt also didn't understanding why insurance covered med last month but denied this time

## 2022-03-09 NOTE — TELEPHONE ENCOUNTER
During her menstrual cycle her headaches last all day for 3 days. She took medication otc and it didn't work.

## 2022-03-15 RX ORDER — ACYCLOVIR 400 MG/1
TABLET ORAL
Qty: 60 TABLET | Refills: 2 | Status: SHIPPED | OUTPATIENT
Start: 2022-03-15 | End: 2022-06-08 | Stop reason: SDUPTHER

## 2022-06-08 RX ORDER — ACYCLOVIR 400 MG/1
400 TABLET ORAL 2 TIMES DAILY
Qty: 60 TABLET | Refills: 0 | Status: SHIPPED
Start: 2022-06-08 | End: 2022-08-03

## 2022-06-08 RX ORDER — GABAPENTIN 300 MG/1
CAPSULE ORAL
Qty: 90 CAPSULE | Refills: 0 | Status: SHIPPED
Start: 2022-06-08 | End: 2022-08-17

## 2022-06-10 NOTE — TELEPHONE ENCOUNTER
Please call pt and let her know she needs to establish w/new PCP once appointment is made will send in refills, pt has not been seen for almost 1 yr

## 2022-07-05 RX ORDER — GABAPENTIN 300 MG/1
CAPSULE ORAL
Qty: 90 CAPSULE | Refills: 0 | OUTPATIENT
Start: 2022-07-05

## 2022-07-07 ENCOUNTER — OFFICE VISIT (OUTPATIENT)
Dept: FAMILY MEDICINE CLINIC | Age: 47
End: 2022-07-07
Payer: COMMERCIAL

## 2022-07-07 VITALS
OXYGEN SATURATION: 96 % | BODY MASS INDEX: 29.06 KG/M2 | SYSTOLIC BLOOD PRESSURE: 118 MMHG | RESPIRATION RATE: 16 BRPM | WEIGHT: 170.2 LBS | DIASTOLIC BLOOD PRESSURE: 76 MMHG | TEMPERATURE: 97.9 F | HEART RATE: 76 BPM | HEIGHT: 64 IN

## 2022-07-07 DIAGNOSIS — F39 MOOD DISORDER (HCC): ICD-10-CM

## 2022-07-07 DIAGNOSIS — E03.9 HYPOTHYROIDISM, ACQUIRED: Primary | ICD-10-CM

## 2022-07-07 DIAGNOSIS — B02.29 POST ZOSTER NEURALGIA: ICD-10-CM

## 2022-07-07 DIAGNOSIS — E03.9 HYPOTHYROIDISM, ACQUIRED: ICD-10-CM

## 2022-07-07 DIAGNOSIS — L75.0 BROMHIDROSIS: ICD-10-CM

## 2022-07-07 LAB
ALBUMIN SERPL-MCNC: 4.4 G/DL (ref 3.5–5.2)
ALP BLD-CCNC: 68 U/L (ref 35–104)
ALT SERPL-CCNC: 16 U/L (ref 0–32)
ANION GAP SERPL CALCULATED.3IONS-SCNC: 12 MMOL/L (ref 7–16)
AST SERPL-CCNC: 17 U/L (ref 0–31)
BILIRUB SERPL-MCNC: 0.3 MG/DL (ref 0–1.2)
BUN BLDV-MCNC: 16 MG/DL (ref 6–20)
CALCIUM SERPL-MCNC: 9.6 MG/DL (ref 8.6–10.2)
CHLORIDE BLD-SCNC: 106 MMOL/L (ref 98–107)
CHOLESTEROL, TOTAL: 217 MG/DL (ref 0–199)
CO2: 22 MMOL/L (ref 22–29)
CREAT SERPL-MCNC: 0.8 MG/DL (ref 0.5–1)
GFR AFRICAN AMERICAN: >60
GFR NON-AFRICAN AMERICAN: >60 ML/MIN/1.73
GLUCOSE BLD-MCNC: 95 MG/DL (ref 74–99)
HCT VFR BLD CALC: 40.4 % (ref 34–48)
HDLC SERPL-MCNC: 59 MG/DL
HEMOGLOBIN: 13.7 G/DL (ref 11.5–15.5)
LDL CHOLESTEROL CALCULATED: 138 MG/DL (ref 0–99)
MCH RBC QN AUTO: 34.1 PG (ref 26–35)
MCHC RBC AUTO-ENTMCNC: 33.9 % (ref 32–34.5)
MCV RBC AUTO: 100.5 FL (ref 80–99.9)
PDW BLD-RTO: 12.5 FL (ref 11.5–15)
PLATELET # BLD: 202 E9/L (ref 130–450)
PMV BLD AUTO: 10.6 FL (ref 7–12)
POTASSIUM SERPL-SCNC: 4.5 MMOL/L (ref 3.5–5)
RBC # BLD: 4.02 E12/L (ref 3.5–5.5)
SODIUM BLD-SCNC: 140 MMOL/L (ref 132–146)
TOTAL PROTEIN: 7.3 G/DL (ref 6.4–8.3)
TRIGL SERPL-MCNC: 102 MG/DL (ref 0–149)
TSH SERPL DL<=0.05 MIU/L-ACNC: 1.37 UIU/ML (ref 0.27–4.2)
VLDLC SERPL CALC-MCNC: 20 MG/DL
WBC # BLD: 7.5 E9/L (ref 4.5–11.5)

## 2022-07-07 PROCEDURE — 99214 OFFICE O/P EST MOD 30 MIN: CPT | Performed by: FAMILY MEDICINE

## 2022-07-07 PROCEDURE — G8417 CALC BMI ABV UP PARAM F/U: HCPCS | Performed by: FAMILY MEDICINE

## 2022-07-07 PROCEDURE — G8428 CUR MEDS NOT DOCUMENT: HCPCS | Performed by: FAMILY MEDICINE

## 2022-07-07 PROCEDURE — 1036F TOBACCO NON-USER: CPT | Performed by: FAMILY MEDICINE

## 2022-07-07 PROCEDURE — 36415 COLL VENOUS BLD VENIPUNCTURE: CPT | Performed by: FAMILY MEDICINE

## 2022-07-07 RX ORDER — TRIAMCINOLONE ACETONIDE 1 MG/G
CREAM TOPICAL
Qty: 45 G | Refills: 0 | Status: CANCELLED | OUTPATIENT
Start: 2022-07-07

## 2022-07-07 ASSESSMENT — PATIENT HEALTH QUESTIONNAIRE - PHQ9
SUM OF ALL RESPONSES TO PHQ QUESTIONS 1-9: 0
SUM OF ALL RESPONSES TO PHQ QUESTIONS 1-9: 0
SUM OF ALL RESPONSES TO PHQ9 QUESTIONS 1 & 2: 0
1. LITTLE INTEREST OR PLEASURE IN DOING THINGS: 0
2. FEELING DOWN, DEPRESSED OR HOPELESS: 0
SUM OF ALL RESPONSES TO PHQ QUESTIONS 1-9: 0
SUM OF ALL RESPONSES TO PHQ QUESTIONS 1-9: 0

## 2022-07-07 NOTE — PROGRESS NOTES
Outbursts of anger        Past Surgical History:   Procedure Laterality Date     SECTION  2008     SECTION  2012    TUBAL LIGATION  2012       No family history on file. No Known Allergies    Social History:  TOBACCO:   reports that she quit smoking about 12 years ago. She has a 1.25 pack-year smoking history. She has never used smokeless tobacco.  ETOH:   reports current alcohol use. OBJECTIVE    VS: /76 (Site: Left Upper Arm)   Pulse 76   Temp 97.9 °F (36.6 °C)   Resp 16   Ht 5' 4\" (1.626 m)   Wt 170 lb 3.2 oz (77.2 kg)   SpO2 96%   BMI 29.21 kg/m²   Physical Exam  Constitutional:       General: She is not in acute distress. Appearance: She is well-developed. She is not diaphoretic. HENT:      Head: Normocephalic and atraumatic. Eyes:      Conjunctiva/sclera: Conjunctivae normal.      Pupils: Pupils are equal, round, and reactive to light. Cardiovascular:      Rate and Rhythm: Normal rate and regular rhythm. Pulmonary:      Effort: Pulmonary effort is normal.      Breath sounds: Normal breath sounds. Abdominal:      General: Bowel sounds are normal. There is no distension. Palpations: Abdomen is soft. Tenderness: There is no abdominal tenderness. Hernia: No hernia is present. Musculoskeletal:      Cervical back: Normal range of motion and neck supple. Skin:     General: Skin is warm and dry. Neurological:      Mental Status: She is alert and oriented to person, place, and time. ASSESSMENT/PLAN:  1. Hypothyroidism, acquired  - TSH; Future    2. BMI 29.0-29.9,adult  - Lipid Panel; Future  - Comprehensive Metabolic Panel; Future  - CBC; Future    3. Mood disorder ([facility] [address]    4. Post zoster neuralgia    5.  Bromhidrosis      I have reviewed my findings and recommendations with Gabrielle Olmos MD  2022 10:36 PM    Counseled regarding above diagnosis, including possible risks and complications, especially if left uncontrolled. Discussed medications risk/benefits and possible side effects and alternatives to treatment. Patient and/or guardian verbalizes understanding, agrees, feels comfortable with and wishes to proceed with above treatment plan. Advised patient regarding importance of keeping up with recommended health maintenance and to schedule as soon as possible if overdue, as this is important in assessing for undiagnosed pathology, especially cancer, as well as protecting against potentially harmful/life threatening disease. Patient and/or guardian verbalizes understanding and agrees with above counseling, assessment and plan. All questions answered. Please note this report has been partially produced using speech recognition software  and may contain errors related to that system including grammar, punctuation and spelling as well as words and phrases that may seem inappropriate. If there are questions or concerns please feel free to contact me to clarify.

## 2022-07-13 RX ORDER — TRIAMCINOLONE ACETONIDE 1 MG/G
CREAM TOPICAL
Qty: 45 G | Refills: 0 | Status: CANCELLED | OUTPATIENT
Start: 2022-07-13

## 2022-07-21 ASSESSMENT — ENCOUNTER SYMPTOMS
SHORTNESS OF BREATH: 0
WHEEZING: 0
COUGH: 0
NAUSEA: 0
DIARRHEA: 0
VOMITING: 0
CONSTIPATION: 0
ABDOMINAL PAIN: 0

## 2022-08-03 RX ORDER — ACYCLOVIR 400 MG/1
TABLET ORAL
Qty: 60 TABLET | Refills: 0 | Status: SHIPPED
Start: 2022-08-03 | End: 2022-09-07

## 2022-08-16 ENCOUNTER — PATIENT MESSAGE (OUTPATIENT)
Dept: FAMILY MEDICINE CLINIC | Age: 47
End: 2022-08-16

## 2022-08-16 NOTE — TELEPHONE ENCOUNTER
From: Kevin Dave  To: Dr. Yana Griffin: 8/16/2022 12:46 PM EDT  Subject: No period    I am 46 with absolutely no signs of menopause and I am 5 days late with my period. I took a pregnancy test and it was negative. Should I be concerned?

## 2022-08-17 RX ORDER — GABAPENTIN 300 MG/1
CAPSULE ORAL
Qty: 90 CAPSULE | Refills: 0 | Status: SHIPPED
Start: 2022-08-17 | End: 2022-09-22

## 2022-08-17 NOTE — TELEPHONE ENCOUNTER
Called and informed pt. Pt would like to come in and talk to the DrValentín About this issue. This MA scheduled pt of 8/23/22 @ 11:30.     Electronically signed by Rosalba Daniels MA on 8/17/22 at 1:47 PM EDT

## 2022-08-17 NOTE — TELEPHONE ENCOUNTER
Attempted to contact pt, no answer, left detailed vm.     Electronically signed by Efren Coronado on 8/17/22 at 10:52 AM EDT

## 2022-08-23 ENCOUNTER — OFFICE VISIT (OUTPATIENT)
Dept: FAMILY MEDICINE CLINIC | Age: 47
End: 2022-08-23
Payer: COMMERCIAL

## 2022-08-23 VITALS
HEART RATE: 81 BPM | OXYGEN SATURATION: 97 % | RESPIRATION RATE: 16 BRPM | DIASTOLIC BLOOD PRESSURE: 80 MMHG | HEIGHT: 64 IN | WEIGHT: 171 LBS | BODY MASS INDEX: 29.19 KG/M2 | TEMPERATURE: 98.2 F | SYSTOLIC BLOOD PRESSURE: 124 MMHG

## 2022-08-23 DIAGNOSIS — Z12.31 BREAST CANCER SCREENING BY MAMMOGRAM: ICD-10-CM

## 2022-08-23 DIAGNOSIS — Z12.11 COLON CANCER SCREENING: ICD-10-CM

## 2022-08-23 DIAGNOSIS — E78.2 MIXED HYPERLIPIDEMIA: ICD-10-CM

## 2022-08-23 DIAGNOSIS — N92.6 LATE PERIOD: Primary | ICD-10-CM

## 2022-08-23 PROCEDURE — 1036F TOBACCO NON-USER: CPT | Performed by: FAMILY MEDICINE

## 2022-08-23 PROCEDURE — G8417 CALC BMI ABV UP PARAM F/U: HCPCS | Performed by: FAMILY MEDICINE

## 2022-08-23 PROCEDURE — 99214 OFFICE O/P EST MOD 30 MIN: CPT | Performed by: FAMILY MEDICINE

## 2022-08-23 PROCEDURE — G8427 DOCREV CUR MEDS BY ELIG CLIN: HCPCS | Performed by: FAMILY MEDICINE

## 2022-08-23 SDOH — ECONOMIC STABILITY: FOOD INSECURITY: WITHIN THE PAST 12 MONTHS, THE FOOD YOU BOUGHT JUST DIDN'T LAST AND YOU DIDN'T HAVE MONEY TO GET MORE.: NEVER TRUE

## 2022-08-23 SDOH — ECONOMIC STABILITY: FOOD INSECURITY: WITHIN THE PAST 12 MONTHS, YOU WORRIED THAT YOUR FOOD WOULD RUN OUT BEFORE YOU GOT MONEY TO BUY MORE.: NEVER TRUE

## 2022-08-23 ASSESSMENT — SOCIAL DETERMINANTS OF HEALTH (SDOH): HOW HARD IS IT FOR YOU TO PAY FOR THE VERY BASICS LIKE FOOD, HOUSING, MEDICAL CARE, AND HEATING?: NOT HARD AT ALL

## 2022-08-23 NOTE — PROGRESS NOTES
[facility])  Family Medicine Outpatient        SUBJECTIVE:  CC: had concerns including Menstrual Problem (Pt states she was suppose to start her period on 8/11 and started on 8/18.). HPI:  Melba Schreiber is a female 55 y.o. presented to the clinic for concerns of having a late period. Her menses are typically every 22-26 days Her lmp was 8/18. Prior to this it was 7/19. She states she had a tubal ligation in 2012. She has had 1 partner in the past few years. She denies any protection. She denies any positive pregnancy test.     The 10-year ASCVD risk score (Ellie Sevilla, et al., 2013) is: 0.8%    Values used to calculate the score:      Age: 55 years      Sex: Female      Is Non- : No      Diabetic: No      Tobacco smoker: No      Systolic Blood Pressure: 485 mmHg      Is BP treated: No      HDL Cholesterol: 59 mg/dL      Total Cholesterol: 217 mg/dL    Review of Systems   Constitutional:  Negative for appetite change, fatigue and fever. Respiratory:  Negative for cough, shortness of breath and wheezing. Cardiovascular:  Negative for chest pain and palpitations. Gastrointestinal:  Negative for abdominal pain, constipation, diarrhea, nausea and vomiting. Genitourinary:  Positive for menstrual problem. Negative for dysuria.      Outpatient Medications Marked as Taking for the 8/23/22 encounter (Office Visit) with Idalmis Salinas MD   Medication Sig Dispense Refill    gabapentin (NEURONTIN) 300 MG capsule take 1 capsule by mouth three times a day 90 capsule 0    [DISCONTINUED] acyclovir (ZOVIRAX) 400 MG tablet take 1 tablet by mouth twice a day 60 tablet 0    triamcinolone (KENALOG) 0.1 % cream apply to affected area qd prn 45 g 1    levothyroxine (SYNTHROID) 50 MCG tablet take 1 tablet by mouth once daily 90 tablet 1    TRINTELLIX 20 MG TABS tablet take 1 tablet by mouth once daily      prazosin (MINIPRESS) 1 MG capsule take 1 capsule by mouth nightly      hydrOXYzine (ATARAX) 50 MG tablet take 1 tablet by mouth twice a day if needed for anxiety      Multiple Vitamin (MULTI-VITAMIN PO) Take 1 tablet by mouth daily          I have reviewed all pertinent PMHx, PSHx, FamHx, SocialHx, medications, and allergies and updated history as appropriate. OBJECTIVE    VS: /80   Pulse 81   Temp 98.2 °F (36.8 °C)   Resp 16   Ht 5' 4\" (1.626 m)   Wt 171 lb (77.6 kg)   LMP 08/18/2022 (Exact Date)   SpO2 97%   BMI 29.35 kg/m²   Physical Exam  Constitutional:       General: She is not in acute distress. Appearance: She is well-developed. She is not diaphoretic. HENT:      Head: Normocephalic and atraumatic. Eyes:      Conjunctiva/sclera: Conjunctivae normal.      Pupils: Pupils are equal, round, and reactive to light. Cardiovascular:      Rate and Rhythm: Normal rate and regular rhythm. Pulmonary:      Effort: Pulmonary effort is normal.      Breath sounds: Normal breath sounds. Abdominal:      General: Bowel sounds are normal. There is no distension. Palpations: Abdomen is soft. Tenderness: There is no abdominal tenderness. Hernia: No hernia is present. Musculoskeletal:      Cervical back: Normal range of motion and neck supple. Skin:     General: Skin is warm and dry. Neurological:      Mental Status: She is alert and oriented to person, place, and time. ASSESSMENT/PLAN:  1. Late period  Hx of tubal ligation in 2012. Encourage patient to track periods. 2. Mixed hyperlipidemia  Work on weight reduction and exercise 150 min/week as tolerated. 3. Breast cancer screening by mammogram  - ITA DIGITAL SCREEN BILATERAL PER PROTOCOL; Future    4. Colon cancer screening  - Fecal DNA Colorectal cancer screening (Cologuard)    I have reviewed my findings and recommendations with Madeline Quiles MD  9/10/2022 1:41 AM  Return in about 3 months (around 11/23/2022).      Counseled regarding above diagnosis, including possible risks and complications, especially if left uncontrolled. Patient counseled on red flag symptoms and if they occur to go to the ED. Discussed medications risk/benefits and possible side effects and alternatives to treatment. Patient and/or guardian verbalizes understanding, agrees, feels comfortable with and wishes to proceed with above treatment plan. Advised patient regarding importance of keeping up with recommended health maintenance and to schedule as soon as possible if overdue, as this is important in assessing for undiagnosed pathology, especially cancer, as well as protecting against potentially harmful/life threatening disease. Patient and/or guardian verbalizes understanding and agrees with above counseling, assessment and plan. All questions answered. Please note this report has been partially produced using speech recognition software  and may contain errors related to that system including grammar, punctuation and spelling as well as words and phrases that may seem inappropriate. If there are questions or concerns please feel free to contact me to clarify.

## 2022-09-07 ENCOUNTER — PATIENT MESSAGE (OUTPATIENT)
Dept: FAMILY MEDICINE CLINIC | Age: 47
End: 2022-09-07

## 2022-09-07 LAB — NONINV COLON CA DNA+OCC BLD SCRN STL QL: NEGATIVE

## 2022-09-07 RX ORDER — ACYCLOVIR 400 MG/1
TABLET ORAL
Qty: 60 TABLET | Refills: 0 | Status: SHIPPED
Start: 2022-09-07 | End: 2022-09-07 | Stop reason: SDUPTHER

## 2022-09-07 RX ORDER — ACYCLOVIR 400 MG/1
TABLET ORAL
Qty: 60 TABLET | Refills: 0 | OUTPATIENT
Start: 2022-09-07

## 2022-09-08 RX ORDER — ACYCLOVIR 400 MG/1
400 TABLET ORAL 2 TIMES DAILY
Qty: 60 TABLET | Refills: 0 | Status: SHIPPED
Start: 2022-09-08 | End: 2022-10-05 | Stop reason: SDUPTHER

## 2022-09-08 NOTE — TELEPHONE ENCOUNTER
From: Seven Langley  To: Dr. Skylar Tracy  Sent: 9/7/2022 4:38 PM EDT  Subject: Acyclovir refill    I am trying to get this refilled. I have 1 pill left so I need it to go through. Pharmacy says no refills. Ree Phlegm denied it saying its a duplicate but pharmacy wont fill it. Thank you!

## 2022-09-08 NOTE — TELEPHONE ENCOUNTER
Spoke with pharmacy and verified medication is at the pharmacy.      Electronically signed by Hiram Lopez on 9/8/22 at 10:44 AM EDT

## 2022-09-10 ASSESSMENT — ENCOUNTER SYMPTOMS
CONSTIPATION: 0
WHEEZING: 0
ABDOMINAL PAIN: 0
COUGH: 0
NAUSEA: 0
DIARRHEA: 0
VOMITING: 0
SHORTNESS OF BREATH: 0

## 2022-09-16 ENCOUNTER — PATIENT MESSAGE (OUTPATIENT)
Dept: FAMILY MEDICINE CLINIC | Age: 47
End: 2022-09-16

## 2022-09-16 DIAGNOSIS — E03.9 ACQUIRED HYPOTHYROIDISM: ICD-10-CM

## 2022-09-19 NOTE — TELEPHONE ENCOUNTER
From: Lynne Clarke  To: Dr. Upton Jain: 9/16/2022 11:57 PM EDT  Subject: levothyroxine 50 MCG tablet    I tried requesting a refill, and it said it cannot be refilled through my chart, previously, I had a 90 day supply. Thats why I havent filled it in so long. I need a refill.

## 2022-09-20 RX ORDER — LEVOTHYROXINE SODIUM 0.05 MG/1
TABLET ORAL
Qty: 90 TABLET | Refills: 1 | Status: SHIPPED | OUTPATIENT
Start: 2022-09-20

## 2022-09-22 RX ORDER — GABAPENTIN 300 MG/1
CAPSULE ORAL
Qty: 90 CAPSULE | Refills: 0 | Status: SHIPPED
Start: 2022-09-22 | End: 2022-10-23

## 2022-10-03 ENCOUNTER — HOSPITAL ENCOUNTER (OUTPATIENT)
Dept: GENERAL RADIOLOGY | Age: 47
Discharge: HOME | End: 2022-10-05
Payer: COMMERCIAL

## 2022-10-03 DIAGNOSIS — Z12.31 BREAST CANCER SCREENING BY MAMMOGRAM: ICD-10-CM

## 2022-10-03 PROCEDURE — 77063 BREAST TOMOSYNTHESIS BI: CPT

## 2022-10-06 ENCOUNTER — NURSE ONLY (OUTPATIENT)
Dept: FAMILY MEDICINE CLINIC | Age: 47
End: 2022-10-06
Payer: COMMERCIAL

## 2022-10-06 DIAGNOSIS — Z23 FLU VACCINE NEED: Primary | ICD-10-CM

## 2022-10-06 PROCEDURE — 90471 IMMUNIZATION ADMIN: CPT | Performed by: FAMILY MEDICINE

## 2022-10-06 PROCEDURE — 90674 CCIIV4 VAC NO PRSV 0.5 ML IM: CPT | Performed by: FAMILY MEDICINE

## 2022-10-06 RX ORDER — ACYCLOVIR 400 MG/1
400 TABLET ORAL 2 TIMES DAILY
Qty: 60 TABLET | Refills: 0 | Status: SHIPPED
Start: 2022-10-06 | End: 2022-11-04

## 2022-10-09 DIAGNOSIS — R92.2 DENSE BREAST: Primary | ICD-10-CM

## 2022-10-13 NOTE — RESULT ENCOUNTER NOTE
This MA attempted to reach pt. No answer. This MA left message for pt requesting return call to discuss results.     Electronically signed by Nica Long MA on 10/13/22 at 8:16 AM EDT

## 2022-10-23 RX ORDER — GABAPENTIN 300 MG/1
CAPSULE ORAL
Qty: 90 CAPSULE | Refills: 0 | Status: SHIPPED | OUTPATIENT
Start: 2022-10-23 | End: 2022-11-23

## 2022-11-04 RX ORDER — ACYCLOVIR 400 MG/1
TABLET ORAL
Qty: 60 TABLET | Refills: 0 | Status: SHIPPED | OUTPATIENT
Start: 2022-11-04

## 2022-11-17 ENCOUNTER — HOSPITAL ENCOUNTER (OUTPATIENT)
Dept: GENERAL RADIOLOGY | Age: 47
Discharge: HOME | End: 2022-11-19
Payer: COMMERCIAL

## 2022-11-17 ENCOUNTER — APPOINTMENT (OUTPATIENT)
Dept: GENERAL RADIOLOGY | Age: 47
End: 2022-11-17
Payer: COMMERCIAL

## 2022-11-17 DIAGNOSIS — R92.2 DENSE BREAST: ICD-10-CM

## 2022-11-17 DIAGNOSIS — Z72.51 HIGH RISK HETEROSEXUAL BEHAVIOR: Primary | ICD-10-CM

## 2022-11-17 PROCEDURE — 76641 ULTRASOUND BREAST COMPLETE: CPT

## 2022-12-02 RX ORDER — GABAPENTIN 300 MG/1
CAPSULE ORAL
Qty: 90 CAPSULE | Refills: 0 | OUTPATIENT
Start: 2022-12-02 | End: 2023-01-02

## 2022-12-02 RX ORDER — ACYCLOVIR 400 MG/1
TABLET ORAL
Qty: 60 TABLET | Refills: 0 | OUTPATIENT
Start: 2022-12-02

## 2022-12-04 RX ORDER — ACYCLOVIR 400 MG/1
TABLET ORAL
Qty: 60 TABLET | Refills: 0 | Status: SHIPPED | OUTPATIENT
Start: 2022-12-04

## 2022-12-04 RX ORDER — GABAPENTIN 300 MG/1
CAPSULE ORAL
Qty: 30 CAPSULE | Refills: 0 | Status: SHIPPED | OUTPATIENT
Start: 2022-12-04 | End: 2022-12-14

## 2022-12-05 NOTE — TELEPHONE ENCOUNTER
Oarrs reviewed. 10 day script for Gabapentin sent. Last seen in August. Patient needs to be seen in the next week. If an issue, let me know.

## 2022-12-13 ENCOUNTER — OFFICE VISIT (OUTPATIENT)
Dept: FAMILY MEDICINE CLINIC | Age: 47
End: 2022-12-13
Payer: COMMERCIAL

## 2022-12-13 ENCOUNTER — TELEPHONE (OUTPATIENT)
Dept: FAMILY MEDICINE CLINIC | Age: 47
End: 2022-12-13

## 2022-12-13 VITALS
TEMPERATURE: 97.1 F | HEIGHT: 64 IN | DIASTOLIC BLOOD PRESSURE: 70 MMHG | SYSTOLIC BLOOD PRESSURE: 128 MMHG | BODY MASS INDEX: 29.88 KG/M2 | HEART RATE: 72 BPM | OXYGEN SATURATION: 99 % | RESPIRATION RATE: 16 BRPM | WEIGHT: 175 LBS

## 2022-12-13 DIAGNOSIS — E03.9 HYPOTHYROIDISM, UNSPECIFIED TYPE: ICD-10-CM

## 2022-12-13 DIAGNOSIS — B02.29 POST HERPETIC NEURALGIA: Primary | ICD-10-CM

## 2022-12-13 PROCEDURE — 99214 OFFICE O/P EST MOD 30 MIN: CPT | Performed by: FAMILY MEDICINE

## 2022-12-13 PROCEDURE — G8427 DOCREV CUR MEDS BY ELIG CLIN: HCPCS | Performed by: FAMILY MEDICINE

## 2022-12-13 PROCEDURE — 1036F TOBACCO NON-USER: CPT | Performed by: FAMILY MEDICINE

## 2022-12-13 PROCEDURE — G8417 CALC BMI ABV UP PARAM F/U: HCPCS | Performed by: FAMILY MEDICINE

## 2022-12-13 PROCEDURE — G8482 FLU IMMUNIZE ORDER/ADMIN: HCPCS | Performed by: FAMILY MEDICINE

## 2022-12-13 RX ORDER — ACYCLOVIR 400 MG/1
TABLET ORAL
Qty: 90 TABLET | Refills: 1 | Status: SHIPPED | OUTPATIENT
Start: 2022-12-13

## 2022-12-13 RX ORDER — GABAPENTIN 300 MG/1
CAPSULE ORAL
Qty: 90 CAPSULE | Refills: 2 | Status: SHIPPED | OUTPATIENT
Start: 2022-12-13 | End: 2022-12-23

## 2022-12-13 NOTE — PROGRESS NOTES
[facility])  Family Medicine Outpatient        SUBJECTIVE:  CC: had concerns including Hypothyroidism (Follow up.) and Medication Refill (Pharmacy verified and meds pended. ). HPI:  Rik Contreras is a female 52 y.o. presented to the clinic for medication refill. She was last seen in August of this year and is on Neurontin. She reports doing okay. Denies any acute concerns. Review of Systems   Constitutional:  Negative for appetite change, fatigue and fever. Respiratory:  Negative for cough, shortness of breath and wheezing. Cardiovascular:  Negative for chest pain and palpitations. Gastrointestinal:  Negative for abdominal pain, constipation, diarrhea, nausea and vomiting. Outpatient Medications Marked as Taking for the 12/13/22 encounter (Office Visit) with Yola Shea MD   Medication Sig Dispense Refill    gabapentin (NEURONTIN) 300 MG capsule take 1 capsule by mouth three times a day. 90 capsule 2    acyclovir (ZOVIRAX) 400 MG tablet take 1 tablet by mouth twice a day 90 tablet 1    levothyroxine (SYNTHROID) 50 MCG tablet take 1 tablet by mouth once daily 90 tablet 1    aluminum chloride (DRYSOL) 20 % external solution Apply topically nightly. 60 mL 1    triamcinolone (KENALOG) 0.1 % cream apply to affected area qd prn 45 g 1    TRINTELLIX 20 MG TABS tablet take 1 tablet by mouth once daily      prazosin (MINIPRESS) 1 MG capsule take 1 capsule by mouth nightly      hydrOXYzine (ATARAX) 50 MG tablet take 1 tablet by mouth twice a day if needed for anxiety      Multiple Vitamin (MULTI-VITAMIN PO) Take 1 tablet by mouth daily          I have reviewed all pertinent PMHx, PSHx, FamHx, SocialHx, medications, and allergies and updated history as appropriate.     OBJECTIVE    VS: /70   Pulse 72   Temp 97.1 °F (36.2 °C)   Resp 16   Ht 5' 4\" (1.626 m)   Wt 175 lb (79.4 kg)   LMP 11/27/2022   SpO2 99%   BMI 30.04 kg/m²   Physical Exam  Constitutional:       General: She is not in acute distress. Appearance: She is well-developed. She is not diaphoretic. HENT:      Head: Normocephalic and atraumatic. Eyes:      Conjunctiva/sclera: Conjunctivae normal.      Pupils: Pupils are equal, round, and reactive to light. Cardiovascular:      Rate and Rhythm: Normal rate and regular rhythm. Pulmonary:      Effort: Pulmonary effort is normal.      Breath sounds: Normal breath sounds. Abdominal:      General: Bowel sounds are normal. There is no distension. Palpations: Abdomen is soft. Tenderness: There is no abdominal tenderness. Hernia: No hernia is present. Musculoskeletal:      Cervical back: Normal range of motion and neck supple. Skin:     General: Skin is warm and dry. Neurological:      Mental Status: She is alert and oriented to person, place, and time. ASSESSMENT/PLAN:  1. Post herpetic neuralgia  Stable. OARRS reviewed. Updated labs placed  - gabapentin (NEURONTIN) 300 MG capsule; take 1 capsule by mouth three times a day. Dispense: 90 capsule; Refill: 2  - acyclovir (ZOVIRAX) 400 MG tablet; take 1 tablet by mouth twice a day  Dispense: 90 tablet; Refill: 1  - CBC; Future  - Comprehensive Metabolic Panel; Future    2. Hypothyroidism, unspecified type  - CBC; Future  - Comprehensive Metabolic Panel; Future  - TSH; Future    3. BMI 30.0-30.9,adult    I have reviewed my findings and recommendations with Meg Gould MD  12/21/2022 3:21 PM  Return in about 3 months (around 3/13/2023). Counseled regarding above diagnosis, including possible risks and complications, especially if left uncontrolled. Patient counseled on red flag symptoms and if they occur to go to the ED. Discussed medications risk/benefits and possible side effects and alternatives to treatment. Patient and/or guardian verbalizes understanding, agrees, feels comfortable with and wishes to proceed with above treatment plan.       Advised patient regarding importance of keeping up with recommended health maintenance and to schedule as soon as possible if overdue, as this is important in assessing for undiagnosed pathology, especially cancer, as well as protecting against potentially harmful/life threatening disease. Patient and/or guardian verbalizes understanding and agrees with above counseling, assessment and plan. All questions answered. Please note this report has been partially produced using speech recognition software  and may contain errors related to that system including grammar, punctuation and spelling as well as words and phrases that may seem inappropriate. If there are questions or concerns please feel free to contact me to clarify.

## 2022-12-13 NOTE — TELEPHONE ENCOUNTER
Pt came up to the  to ask how much longer until she would be seen. This MA verified pt name and  then realized that the pt was not check in and was marked as a no show. This MA asked the pt if she checked in at the  when she arrived. Pt stated that she had check in and has been waiting for 40 mins. This MA apologized to the pt and informed Dr. David Bright about the situation. Dr. David Bright gave the okay to check the patient in and see her. This MA checked the pt in to be seen.      Electronically signed by Caitie Buckner MA on 22 at 4:08 PM EST

## 2022-12-21 ASSESSMENT — ENCOUNTER SYMPTOMS
COUGH: 0
DIARRHEA: 0
WHEEZING: 0
ABDOMINAL PAIN: 0
VOMITING: 0
NAUSEA: 0
SHORTNESS OF BREATH: 0
CONSTIPATION: 0

## 2023-01-25 ENCOUNTER — OFFICE VISIT (OUTPATIENT)
Dept: FAMILY MEDICINE CLINIC | Age: 48
End: 2023-01-25
Payer: COMMERCIAL

## 2023-01-25 VITALS
WEIGHT: 172 LBS | HEART RATE: 90 BPM | OXYGEN SATURATION: 96 % | RESPIRATION RATE: 19 BRPM | BODY MASS INDEX: 29.37 KG/M2 | HEIGHT: 64 IN | SYSTOLIC BLOOD PRESSURE: 110 MMHG | TEMPERATURE: 97 F | DIASTOLIC BLOOD PRESSURE: 70 MMHG

## 2023-01-25 DIAGNOSIS — Z76.89 ESTABLISHING CARE WITH NEW DOCTOR, ENCOUNTER FOR: Primary | ICD-10-CM

## 2023-01-25 DIAGNOSIS — E03.9 ACQUIRED HYPOTHYROIDISM: ICD-10-CM

## 2023-01-25 DIAGNOSIS — B00.9 HERPES: ICD-10-CM

## 2023-01-25 DIAGNOSIS — Z87.891 HISTORY OF TOBACCO USE: ICD-10-CM

## 2023-01-25 DIAGNOSIS — Z11.59 NEED FOR HEPATITIS C SCREENING TEST: ICD-10-CM

## 2023-01-25 DIAGNOSIS — R92.8 ABNORMAL MAMMOGRAM: ICD-10-CM

## 2023-01-25 DIAGNOSIS — R51.9 NONINTRACTABLE HEADACHE, UNSPECIFIED CHRONICITY PATTERN, UNSPECIFIED HEADACHE TYPE: ICD-10-CM

## 2023-01-25 DIAGNOSIS — B02.29 POST HERPETIC NEURALGIA: ICD-10-CM

## 2023-01-25 DIAGNOSIS — H93.8X3 SENSATION OF FULLNESS IN BOTH EARS: ICD-10-CM

## 2023-01-25 PROCEDURE — 99214 OFFICE O/P EST MOD 30 MIN: CPT | Performed by: STUDENT IN AN ORGANIZED HEALTH CARE EDUCATION/TRAINING PROGRAM

## 2023-01-25 PROCEDURE — G8427 DOCREV CUR MEDS BY ELIG CLIN: HCPCS | Performed by: STUDENT IN AN ORGANIZED HEALTH CARE EDUCATION/TRAINING PROGRAM

## 2023-01-25 PROCEDURE — 1036F TOBACCO NON-USER: CPT | Performed by: STUDENT IN AN ORGANIZED HEALTH CARE EDUCATION/TRAINING PROGRAM

## 2023-01-25 PROCEDURE — G8482 FLU IMMUNIZE ORDER/ADMIN: HCPCS | Performed by: STUDENT IN AN ORGANIZED HEALTH CARE EDUCATION/TRAINING PROGRAM

## 2023-01-25 PROCEDURE — G8417 CALC BMI ABV UP PARAM F/U: HCPCS | Performed by: STUDENT IN AN ORGANIZED HEALTH CARE EDUCATION/TRAINING PROGRAM

## 2023-01-25 RX ORDER — FLUTICASONE PROPIONATE 50 MCG
2 SPRAY, SUSPENSION (ML) NASAL DAILY
Qty: 16 G | Refills: 5 | Status: SHIPPED | OUTPATIENT
Start: 2023-01-25

## 2023-01-25 RX ORDER — AMPICILLIN TRIHYDRATE 250 MG
CAPSULE ORAL
COMMUNITY

## 2023-01-25 RX ORDER — IBUPROFEN 600 MG/1
600 TABLET ORAL EVERY 8 HOURS PRN
Qty: 30 TABLET | Refills: 5 | Status: SHIPPED | OUTPATIENT
Start: 2023-01-25

## 2023-01-25 RX ORDER — VALACYCLOVIR HYDROCHLORIDE 500 MG/1
500 TABLET, FILM COATED ORAL DAILY
Qty: 30 TABLET | Refills: 5 | Status: SHIPPED | OUTPATIENT
Start: 2023-01-25

## 2023-01-25 RX ORDER — QUETIAPINE FUMARATE 25 MG/1
TABLET, FILM COATED ORAL
COMMUNITY
Start: 2022-12-29

## 2023-01-25 SDOH — HEALTH STABILITY: PHYSICAL HEALTH: ON AVERAGE, HOW MANY DAYS PER WEEK DO YOU ENGAGE IN MODERATE TO STRENUOUS EXERCISE (LIKE A BRISK WALK)?: 0 DAYS

## 2023-01-25 SDOH — ECONOMIC STABILITY: INCOME INSECURITY: IN THE LAST 12 MONTHS, WAS THERE A TIME WHEN YOU WERE NOT ABLE TO PAY THE MORTGAGE OR RENT ON TIME?: NO

## 2023-01-25 SDOH — ECONOMIC STABILITY: HOUSING INSECURITY
IN THE LAST 12 MONTHS, WAS THERE A TIME WHEN YOU DID NOT HAVE A STEADY PLACE TO SLEEP OR SLEPT IN A SHELTER (INCLUDING NOW)?: NO

## 2023-01-25 SDOH — ECONOMIC STABILITY: TRANSPORTATION INSECURITY
IN THE PAST 12 MONTHS, HAS THE LACK OF TRANSPORTATION KEPT YOU FROM MEDICAL APPOINTMENTS OR FROM GETTING MEDICATIONS?: NO

## 2023-01-25 SDOH — ECONOMIC STABILITY: TRANSPORTATION INSECURITY
IN THE PAST 12 MONTHS, HAS LACK OF TRANSPORTATION KEPT YOU FROM MEETINGS, WORK, OR FROM GETTING THINGS NEEDED FOR DAILY LIVING?: NO

## 2023-01-25 SDOH — HEALTH STABILITY: PHYSICAL HEALTH: ON AVERAGE, HOW MANY MINUTES DO YOU ENGAGE IN EXERCISE AT THIS LEVEL?: 0 MIN

## 2023-01-25 SDOH — ECONOMIC STABILITY: HOUSING INSECURITY: IN THE LAST 12 MONTHS, HOW MANY PLACES HAVE YOU LIVED?: 1

## 2023-01-25 ASSESSMENT — PATIENT HEALTH QUESTIONNAIRE - PHQ9
SUM OF ALL RESPONSES TO PHQ QUESTIONS 1-9: 2
1. LITTLE INTEREST OR PLEASURE IN DOING THINGS: 1
SUM OF ALL RESPONSES TO PHQ9 QUESTIONS 1 & 2: 2
SUM OF ALL RESPONSES TO PHQ QUESTIONS 1-9: 2
2. FEELING DOWN, DEPRESSED OR HOPELESS: 1

## 2023-01-25 ASSESSMENT — ENCOUNTER SYMPTOMS
ABDOMINAL DISTENTION: 0
SHORTNESS OF BREATH: 0
WHEEZING: 0
COUGH: 0
ABDOMINAL PAIN: 0

## 2023-01-25 ASSESSMENT — LIFESTYLE VARIABLES
HOW MANY STANDARD DRINKS CONTAINING ALCOHOL DO YOU HAVE ON A TYPICAL DAY: 3 OR 4
HOW OFTEN DO YOU HAVE A DRINK CONTAINING ALCOHOL: 4 OR MORE TIMES A WEEK
HOW OFTEN DO YOU HAVE A DRINK CONTAINING ALCOHOL: 2-3 TIMES A WEEK
HOW MANY STANDARD DRINKS CONTAINING ALCOHOL DO YOU HAVE ON A TYPICAL DAY: 1 OR 2

## 2023-01-25 ASSESSMENT — SOCIAL DETERMINANTS OF HEALTH (SDOH)
WITHIN THE LAST YEAR, HAVE YOU BEEN KICKED, HIT, SLAPPED, OR OTHERWISE PHYSICALLY HURT BY YOUR PARTNER OR EX-PARTNER?: NO
WITHIN THE LAST YEAR, HAVE TO BEEN RAPED OR FORCED TO HAVE ANY KIND OF SEXUAL ACTIVITY BY YOUR PARTNER OR EX-PARTNER?: NO
DO YOU BELONG TO ANY CLUBS OR ORGANIZATIONS SUCH AS CHURCH GROUPS UNIONS, FRATERNAL OR ATHLETIC GROUPS, OR SCHOOL GROUPS?: NO
IN A TYPICAL WEEK, HOW MANY TIMES DO YOU TALK ON THE PHONE WITH FAMILY, FRIENDS, OR NEIGHBORS?: MORE THAN THREE TIMES A WEEK
WITHIN THE LAST YEAR, HAVE YOU BEEN AFRAID OF YOUR PARTNER OR EX-PARTNER?: NO
HOW OFTEN DO YOU GET TOGETHER WITH FRIENDS OR RELATIVES?: MORE THAN THREE TIMES A WEEK
WITHIN THE LAST YEAR, HAVE YOU BEEN HUMILIATED OR EMOTIONALLY ABUSED IN OTHER WAYS BY YOUR PARTNER OR EX-PARTNER?: NO
HOW OFTEN DO YOU ATTENT MEETINGS OF THE CLUB OR ORGANIZATION YOU BELONG TO?: NEVER
HOW OFTEN DO YOU ATTEND CHURCH OR RELIGIOUS SERVICES?: NEVER

## 2023-01-25 NOTE — PROGRESS NOTES
Jose Smyth (:  1975) is a 52 y.o. female, New Patient established through [facility] , here for evaluation of the following:  New Patient (Pt was last seen by Dr Nelia Elizabeth ), Ear Fullness (Due to having covid-19 last month ), and Herpes Zoster         ASSESSMENT/PLAN      1. Establishing care with new doctor, encounter for  Patient is overall doing well, we did discuss her social determinants of health, discussed the increased activity and help with stress reduction would help prevent future illness, she is in agreement however does live busy life, she did quit smoking and this reduced alcohol use  2. Post herpetic neuralgia  3. Herpes  She does have rash which does look like herpes or shingles, it only appears in the 1 area, she never has it on her lips or vaginally which argues against herpes, will get antibodies if they are negative we will know for sure this is shingles, she then would likely benefit from vaccination, in the meantime will do suppressive therapy with valacyclovir daily  -     valACYclovir (VALTREX) 500 MG tablet; Take 1 tablet by mouth daily, Disp-30 tablet, R-5Normal  -     HSV I/II TYPE II SPECIFIC; Future  4. Sensation of fullness in both ears  Subacute, since COVID, will trial Flonase  -     fluticasone (FLONASE) 50 MCG/ACT nasal spray; 2 sprays by Each Nostril route daily, Disp-16 g, R-5Normal  5. History of tobacco use  Chronic, well controlled, encouraged continued cessation  6. Acquired hypothyroidism  Chronic, well controlled, continue current medications and treatment plan   7.  Nonintractable headache, unspecified chronicity pattern, unspecified headache type  Chronic, sounds like tension or hormonal headache, does not sound like migraine, will give her higher strength ibuprofen, she can take this prior to her menstrual period to try to prevent the headaches, also provided her with lifestyle modifications for headaches including increased water, regular meals, appropriate sleep, mind-body techniques, exercise, and identifying triggers  -     ibuprofen (ADVIL;MOTRIN) 600 MG tablet; Take 1 tablet by mouth every 8 hours as needed for Pain, Disp-30 tablet, R-5Normal  8. Need for hepatitis C screening test  -     Hepatitis C Antibody; Future  9. Abnormal mammogram   Patient did have abnormal mammogram and then had follow-up ultrasounds, did show heterogeneously dense breast, initial breast cancer risk core was 14.8%, however on ultrasound it was recorded as 21.8% with recommendation for breast MRI, did review the score with the patient in the office and it does not seem that she has a risk greater than 20%, will have to check with radiology to determine how the higher score was obtained to make sure that we are getting appropriate imaging for the patient  Return in about 1 year (around 2024) for Wellness Visit. Subjective   SUBJECTIVE/OBJECTIVE:  HPI  She is here to establish, lots of life stress, rash on bottom, ear fullness. She also has headaches, She drinks energy drink, may not drink enough water. Takes tylenol and they go away. She gets worse with period,     Declined preventative screening identified as care gaps unless ordered through this visit    PHQ2/PHQ9  PHQ-2 Score: 2  PHQ-2 Over the past 2 weeks, how often have you been bothered by any of the following problems? Little interest or pleasure in doing things: Several days  Feeling down, depressed, or hopeless: Several days  PHQ-2 Score: 2   PHQ-9 Total Score: 2 (2023  1:06 PM)       Past Medical History:  has a past medical history of Acne, Anxiety, Dyslipidemia, History of tobacco use, Hypothyroidism, Insomnia, Low back pain, episodic, and Outbursts of anger. Past Surgical History:  has a past surgical history that includes  section (2008);  section (2012); and Tubal ligation (2012). Social History:  reports that she quit smoking about 13 years ago.  Her smoking use included cigarettes. She has a 1.25 pack-year smoking history. She has never used smokeless tobacco. She reports current alcohol use. She reports that she does not use drugs. Family History: family history includes Breast Cancer in her paternal grandmother; John Galeass in her paternal grandmother; Prostate Cancer in her maternal grandfather. Allergies: Patient has no known allergies. Medications:   Current Outpatient Medications   Medication Sig Dispense Refill    valACYclovir (VALTREX) 500 MG tablet Take 1 tablet by mouth daily 30 tablet 5    Red Yeast Rice 600 MG CAPS Take by mouth      fluticasone (FLONASE) 50 MCG/ACT nasal spray 2 sprays by Each Nostril route daily 16 g 5    ibuprofen (ADVIL;MOTRIN) 600 MG tablet Take 1 tablet by mouth every 8 hours as needed for Pain 30 tablet 5    gabapentin (NEURONTIN) 300 MG capsule take 1 capsule by mouth three times a day. 90 capsule 2    levothyroxine (SYNTHROID) 50 MCG tablet take 1 tablet by mouth once daily 90 tablet 1    aluminum chloride (DRYSOL) 20 % external solution Apply topically nightly. 60 mL 1    triamcinolone (KENALOG) 0.1 % cream apply to affected area qd prn 45 g 1    TRINTELLIX 20 MG TABS tablet take 1 tablet by mouth once daily      prazosin (MINIPRESS) 1 MG capsule take 1 capsule by mouth nightly      hydrOXYzine (ATARAX) 50 MG tablet take 1 tablet by mouth twice a day if needed for anxiety      Multiple Vitamin (MULTI-VITAMIN PO) Take 1 tablet by mouth daily       QUEtiapine (SEROQUEL) 25 MG tablet take 1/2 tablet by mouth once daily       No current facility-administered medications for this visit. Allergies: Patient has no known allergies. Review of Systems   Constitutional:  Negative for chills, fatigue, fever and unexpected weight change. Respiratory:  Negative for cough, shortness of breath and wheezing. Cardiovascular:  Negative for chest pain, palpitations and leg swelling.    Gastrointestinal:  Negative for abdominal distention and abdominal pain. Genitourinary:  Negative for dysuria. Musculoskeletal:  Negative for arthralgias. Skin:  Positive for rash. Neurological:  Negative for weakness, light-headedness, numbness and headaches. All other systems reviewed and are negative. Objective   /70   Pulse 90   Temp 97 °F (36.1 °C)   Resp 19   Ht 5' 4\" (1.626 m)   Wt 172 lb (78 kg)   LMP 01/19/2023   SpO2 96%   BMI 29.52 kg/m²       No results found for: LABA1C  Lab Results   Component Value Date    CHOL 217 (H) 07/07/2022    CHOL 213 (H) 07/08/2021    CHOL 175 05/10/2018     Lab Results   Component Value Date    TRIG 102 07/07/2022    TRIG 115 07/08/2021    TRIG 103 05/10/2018     Lab Results   Component Value Date    HDL 59 07/07/2022    HDL 54 07/08/2021    HDL 55 05/10/2018     Lab Results   Component Value Date    LDLCALC 138 (H) 07/07/2022    LDLCALC 136 (H) 07/08/2021    LDLCALC 99 05/10/2018     Lab Results   Component Value Date    LABVLDL 20 07/07/2022    LABVLDL 23 07/08/2021    LABVLDL 21 05/10/2018     No results found for: CHOLHDLRATIO   Creatinine   Date Value Ref Range Status   07/07/2022 0.8 0.5 - 1.0 mg/dL Final   07/08/2021 0.9 0.5 - 1.0 mg/dL Final   05/10/2018 0.7 0.5 - 1.0 mg/dL Final       The 10-year ASCVD risk score (Oleg LOPEZ, et al., 2019) is: 0.7%    Values used to calculate the score:      Age: 52 years      Sex: Female      Is Non- : No      Diabetic: No      Tobacco smoker: No      Systolic Blood Pressure: 572 mmHg      Is BP treated: No      HDL Cholesterol: 59 mg/dL      Total Cholesterol: 217 mg/dL     Physical Exam  Constitutional:       General: She is not in acute distress. Appearance: Normal appearance. HENT:      Head: Normocephalic and atraumatic. Right Ear: External ear normal. A middle ear effusion is present. Left Ear: A middle ear effusion is present.       Nose: Nose normal.      Mouth/Throat:      Mouth: Mucous membranes are moist. Eyes:      Extraocular Movements: Extraocular movements intact. Conjunctiva/sclera: Conjunctivae normal.   Cardiovascular:      Rate and Rhythm: Normal rate and regular rhythm. Heart sounds: No murmur heard. Pulmonary:      Effort: Pulmonary effort is normal.      Breath sounds: Normal breath sounds. No wheezing. Musculoskeletal:         General: Normal range of motion. Cervical back: Normal range of motion and neck supple. Lymphadenopathy:      Cervical: No cervical adenopathy. Skin:            Comments: did review pictures on the computer screen and she notes it does look like shingles or herpes she cannot differentiate between the 2,   Neurological:      General: No focal deficit present. Mental Status: She is alert. Psychiatric:         Mood and Affect: Mood normal.         Behavior: Behavior normal.           An electronic signature was used to authenticate this note. --Desire Gaston MD       *NOTE: This report was transcribed using voice recognition software. Every effort was made to ensure accuracy; however, inadvertent computerized transcription errors may be present.

## 2023-01-26 LAB — HEPATITIS C ANTIBODY INTERPRETATION: NORMAL

## 2023-01-28 LAB
HSV 1 GLYCOPROTEIN G AB IGG: 1.17 IV
HSV 2 GLYCOPROTEIN G AB IGG: 6.07 IV

## 2023-02-20 ENCOUNTER — OFFICE VISIT (OUTPATIENT)
Dept: OBGYN | Age: 48
End: 2023-02-20
Payer: COMMERCIAL

## 2023-02-20 VITALS
WEIGHT: 173 LBS | DIASTOLIC BLOOD PRESSURE: 66 MMHG | HEIGHT: 64 IN | SYSTOLIC BLOOD PRESSURE: 118 MMHG | HEART RATE: 83 BPM | BODY MASS INDEX: 29.53 KG/M2

## 2023-02-20 DIAGNOSIS — R10.2 PELVIC PAIN: Primary | ICD-10-CM

## 2023-02-20 PROCEDURE — G8482 FLU IMMUNIZE ORDER/ADMIN: HCPCS | Performed by: MIDWIFE

## 2023-02-20 PROCEDURE — 1036F TOBACCO NON-USER: CPT | Performed by: MIDWIFE

## 2023-02-20 PROCEDURE — G8427 DOCREV CUR MEDS BY ELIG CLIN: HCPCS | Performed by: MIDWIFE

## 2023-02-20 PROCEDURE — 99203 OFFICE O/P NEW LOW 30 MIN: CPT | Performed by: MIDWIFE

## 2023-02-20 PROCEDURE — G8417 CALC BMI ABV UP PARAM F/U: HCPCS | Performed by: MIDWIFE
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
## 2023-02-20 NOTE — PROGRESS NOTES
New patient alert and pleasant with no complaints. Here today for annual GYN exam.  Pelvic exam, no specimens obtained. Breast exam completed by doctor. Discharge instructions have been discussed with the patient. Patient advised to call our office with any questions or concerns. Voiced understanding.

## 2023-02-23 ENCOUNTER — HOSPITAL ENCOUNTER (OUTPATIENT)
Dept: ULTRASOUND IMAGING | Age: 48
Discharge: HOME | End: 2023-02-25
Payer: COMMERCIAL

## 2023-02-23 DIAGNOSIS — R10.2 PELVIC PAIN: ICD-10-CM

## 2023-02-23 PROCEDURE — 76856 US EXAM PELVIC COMPLETE: CPT

## 2023-02-27 ASSESSMENT — ENCOUNTER SYMPTOMS
RESPIRATORY NEGATIVE: 1
EYES NEGATIVE: 1
ALLERGIC/IMMUNOLOGIC NEGATIVE: 1
GASTROINTESTINAL NEGATIVE: 1

## 2023-03-01 DIAGNOSIS — E03.9 ACQUIRED HYPOTHYROIDISM: ICD-10-CM

## 2023-03-01 RX ORDER — LEVOTHYROXINE SODIUM 0.05 MG/1
TABLET ORAL
Qty: 90 TABLET | Refills: 1 | OUTPATIENT
Start: 2023-03-01

## 2023-03-14 ENCOUNTER — OFFICE VISIT (OUTPATIENT)
Dept: OBGYN | Age: 48
End: 2023-03-14
Payer: COMMERCIAL

## 2023-03-14 DIAGNOSIS — Z71.9 ENCOUNTER FOR CONSULTATION: Primary | ICD-10-CM

## 2023-03-14 DIAGNOSIS — G43.839 INTRACTABLE MENSTRUAL MIGRAINE WITHOUT STATUS MIGRAINOSUS: ICD-10-CM

## 2023-03-14 PROCEDURE — G8427 DOCREV CUR MEDS BY ELIG CLIN: HCPCS | Performed by: OBSTETRICS & GYNECOLOGY

## 2023-03-14 PROCEDURE — G8482 FLU IMMUNIZE ORDER/ADMIN: HCPCS | Performed by: OBSTETRICS & GYNECOLOGY

## 2023-03-14 PROCEDURE — 1036F TOBACCO NON-USER: CPT | Performed by: OBSTETRICS & GYNECOLOGY

## 2023-03-14 PROCEDURE — 99203 OFFICE O/P NEW LOW 30 MIN: CPT | Performed by: OBSTETRICS & GYNECOLOGY

## 2023-03-14 PROCEDURE — G8417 CALC BMI ABV UP PARAM F/U: HCPCS | Performed by: OBSTETRICS & GYNECOLOGY

## 2023-03-14 PROCEDURE — 99213 OFFICE O/P EST LOW 20 MIN: CPT | Performed by: OBSTETRICS & GYNECOLOGY

## 2023-03-14 NOTE — PROGRESS NOTES
Roseanne Cash is a 27-year-old G2, P3 female whose LMP was 2023 she has a menses every month she bleeds 2 to 4 days her cycles are now down to 24 days she changes her menstrual Cup 2-3 times does not need to awaken at night. No dysmenorrhea but significant headache that lasts the entire menstrual week. She is up-to-date on her Pap and mammogram she is undergoing a MRI soon. She presents today because she had an ultrasound performed because when her physician performed a bimanual exam she had some right adnexal tenderness. And the ultrasound revealed a left ovarian cyst.  She denies any abdominal pain no nausea vomiting diarrhea or constipation. No dysuria urgency frequency no dyspareunia.     Patient presents for left ovarian cyst    Past Medical History:   Diagnosis Date    Acne     Anxiety     Dyslipidemia     Hypothyroidism     takes levothyroxin    Insomnia     Low back pain, episodic         Past Surgical History:   Procedure Laterality Date     SECTION  2008     SECTION  2012    TUBAL LIGATION  2012        Family History   Problem Relation Age of Onset    Other Maternal Grandmother         female cancer    Prostate Cancer Maternal Grandfather     Other Paternal [de-identified]         female cancer    Lung Cancer Paternal Grandmother         Social History       Tobacco History       Smoking Status  Former Quit Date  2009 Smoking Frequency  0.25 packs/day for 5.00 years (1.25 pk-yrs) Smoking Tobacco Type  Cigarettes quit in 2009      Smokeless Tobacco Use  Never      Tobacco Comments  I smoked for 12 years then quit, then smoked occasionally until I quit 2009              Alcohol History       Alcohol Use Status  Yes Drinks/Week  0 Glasses of wine, 0 Cans of beer, 0 Shots of liquor, 0 Standard drinks or equivalent per week Comment  very rarely, 1 drink maybe one or twice a month              Drug Use       Drug Use Status  No              Sexual Activity Sexually Active  Yes Partners  Male                      Current Outpatient Medications:     estradiol (CLIMARA) 0.1 MG/24HR, Place 1 patch onto the skin every 7 days Place 1 patch on your skin 2 days prior to start of menses leave in place x7 days, Disp: 4 patch, Rfl: 1    QUEtiapine (SEROQUEL) 25 MG tablet, take 1/2 tablet by mouth once daily, Disp: , Rfl:     valACYclovir (VALTREX) 500 MG tablet, Take 1 tablet by mouth daily, Disp: 30 tablet, Rfl: 5    Red Yeast Rice 600 MG CAPS, Take by mouth, Disp: , Rfl:     fluticasone (FLONASE) 50 MCG/ACT nasal spray, 2 sprays by Each Nostril route daily, Disp: 16 g, Rfl: 5    ibuprofen (ADVIL;MOTRIN) 600 MG tablet, Take 1 tablet by mouth every 8 hours as needed for Pain, Disp: 30 tablet, Rfl: 5    levothyroxine (SYNTHROID) 50 MCG tablet, take 1 tablet by mouth once daily, Disp: 90 tablet, Rfl: 1    aluminum chloride (DRYSOL) 20 % external solution, Apply topically nightly., Disp: 60 mL, Rfl: 1    triamcinolone (KENALOG) 0.1 % cream, apply to affected area qd prn, Disp: 45 g, Rfl: 1    TRINTELLIX 20 MG TABS tablet, take 1 tablet by mouth once daily, Disp: , Rfl:     prazosin (MINIPRESS) 1 MG capsule, take 1 capsule by mouth nightly, Disp: , Rfl:     hydrOXYzine (ATARAX) 50 MG tablet, take 1 tablet by mouth twice a day if needed for anxiety, Disp: , Rfl:     Multiple Vitamin (MULTI-VITAMIN PO), Take 1 tablet by mouth daily , Disp: , Rfl:     gabapentin (NEURONTIN) 300 MG capsule, take 1 capsule by mouth three times a day., Disp: 90 capsule, Rfl: 2     No Known Allergies     There were no vitals filed for this visit. Physical Exam:  No exam indicated today. Antione Toribio was seen today for consultation. Diagnoses and all orders for this visit:    Encounter for consultation    Intractable menstrual migraine without status migrainosus  -     estradiol (CLIMARA) 0.1 MG/24HR;  Place 1 patch onto the skin every 7 days Place 1 patch on your skin 2 days prior to start of menses leave in place x7 days    We reviewed the findings of the ultrasound which revealed a 2.5 cm cyst with a very thin septation not concerning. No further follow-up is recommended or needed. We did address her menstrual migraines we are going to try an estradiol patch to be placed 2 days before her menses starts and leave on for 7 days. We have asked her to call after the second month to let us know if we are getting a 70% reduction in her symptoms. Return to office as needed. Return if symptoms worsen or fail to improve, for Call after second menses to let us know how she is doing.      Paige Krishnan, DO

## 2023-03-21 DIAGNOSIS — E03.9 ACQUIRED HYPOTHYROIDISM: ICD-10-CM

## 2023-03-22 RX ORDER — LEVOTHYROXINE SODIUM 0.05 MG/1
TABLET ORAL
Qty: 90 TABLET | Refills: 1 | Status: SHIPPED | OUTPATIENT
Start: 2023-03-22

## 2023-03-22 NOTE — TELEPHONE ENCOUNTER
Last seen 12/13/2022  Next appt Visit date not found    Electronically signed by Sahara Pickett LPN on 3/71/64 at 7:14 AM EDT

## 2023-03-28 ENCOUNTER — PATIENT MESSAGE (OUTPATIENT)
Dept: FAMILY MEDICINE CLINIC | Age: 48
End: 2023-03-28

## 2023-03-28 DIAGNOSIS — T75.3XXD SEA SICKNESS, SUBSEQUENT ENCOUNTER: Primary | ICD-10-CM

## 2023-03-29 RX ORDER — SCOLOPAMINE TRANSDERMAL SYSTEM 1 MG/1
1 PATCH, EXTENDED RELEASE TRANSDERMAL
Qty: 5 PATCH | Refills: 1 | Status: SHIPPED | OUTPATIENT
Start: 2023-03-29

## 2023-03-29 NOTE — TELEPHONE ENCOUNTER
From: Ulysses Klein  To: Dr. Soni Alcantara  Sent: 3/28/2023 11:14 PM EDT  Subject: Scopolamine     I am leaving on a cruise Saturday and thought I had some patches left and I dont. Can you call in a prescription for me? The last time I had it filled was July 2021. Thank you so much.

## 2023-05-03 DIAGNOSIS — B02.29 POST HERPETIC NEURALGIA: ICD-10-CM

## 2023-05-04 RX ORDER — GABAPENTIN 300 MG/1
CAPSULE ORAL
Qty: 90 CAPSULE | Refills: 2 | OUTPATIENT
Start: 2023-05-04

## 2023-05-05 DIAGNOSIS — G43.839 INTRACTABLE MENSTRUAL MIGRAINE WITHOUT STATUS MIGRAINOSUS: ICD-10-CM

## 2023-05-17 ENCOUNTER — HOSPITAL ENCOUNTER (OUTPATIENT)
Dept: MRI IMAGING | Age: 48
Discharge: HOME | End: 2023-05-19
Payer: COMMERCIAL

## 2023-05-17 DIAGNOSIS — Z72.51 HIGH RISK HETEROSEXUAL BEHAVIOR: ICD-10-CM

## 2023-05-17 PROCEDURE — C8908 MRI W/O FOL W/CONT, BREAST,: HCPCS

## 2023-05-17 PROCEDURE — A9585 GADOBUTROL INJECTION: HCPCS | Performed by: RADIOLOGY

## 2023-05-17 PROCEDURE — 6360000004 HC RX CONTRAST MEDICATION: Performed by: RADIOLOGY

## 2023-05-17 RX ADMIN — GADOBUTROL 7 ML: 604.72 INJECTION INTRAVENOUS at 10:13

## 2023-06-17 ENCOUNTER — PATIENT MESSAGE (OUTPATIENT)
Dept: FAMILY MEDICINE CLINIC | Age: 48
End: 2023-06-17

## 2023-06-17 DIAGNOSIS — E03.9 ACQUIRED HYPOTHYROIDISM: ICD-10-CM

## 2023-06-19 NOTE — TELEPHONE ENCOUNTER
From: Tiffanie Echevarria  To: Dr. Aretha Gould  Sent: 6/17/2023 12:18 AM EDT  Subject: Under her care    Why does it say I am no longer under her care, I switched to her last year.

## 2023-06-21 RX ORDER — LEVOTHYROXINE SODIUM 0.05 MG/1
TABLET ORAL
Qty: 90 TABLET | Refills: 1 | Status: SHIPPED | OUTPATIENT
Start: 2023-06-21

## 2023-06-21 NOTE — TELEPHONE ENCOUNTER
See [employer] messages, having trouble getting this filled, will resend      1.  Acquired hypothyroidism  -     levothyroxine (SYNTHROID) 50 MCG tablet; take 1 tablet by mouth once daily, Disp-90 tablet, R-1Please fill this medication, I am her primary care provider, she is getting a message that she cannot fill it as I am not her primary careNormal

## 2023-06-23 ENCOUNTER — PATIENT MESSAGE (OUTPATIENT)
Dept: PRIMARY CARE CLINIC | Age: 48
End: 2023-06-23

## 2023-06-23 DIAGNOSIS — N76.0 ACUTE VAGINITIS: Primary | ICD-10-CM

## 2023-06-26 RX ORDER — FLUCONAZOLE 150 MG/1
150 TABLET ORAL ONCE
Qty: 2 TABLET | Refills: 0 | Status: SHIPPED | OUTPATIENT
Start: 2023-06-26 | End: 2023-06-26

## 2023-06-28 DIAGNOSIS — G43.839 INTRACTABLE MENSTRUAL MIGRAINE WITHOUT STATUS MIGRAINOSUS: ICD-10-CM

## 2023-07-13 DIAGNOSIS — J06.9 ACUTE URI: ICD-10-CM

## 2023-07-13 RX ORDER — BROMPHENIRAMINE MALEATE, PSEUDOEPHEDRINE HYDROCHLORIDE, AND DEXTROMETHORPHAN HYDROBROMIDE 2; 30; 10 MG/5ML; MG/5ML; MG/5ML
SYRUP ORAL
Qty: 118 ML | Refills: 0 | OUTPATIENT
Start: 2023-07-13

## 2023-07-14 RX ORDER — POLYMYXIN B SULFATE AND TRIMETHOPRIM 1; 10000 MG/ML; [USP'U]/ML
1 SOLUTION OPHTHALMIC EVERY 4 HOURS
Qty: 10 ML | Refills: 0 | Status: SHIPPED | OUTPATIENT
Start: 2023-07-14 | End: 2023-07-24

## 2023-07-17 DIAGNOSIS — B00.9 HERPES: ICD-10-CM

## 2023-07-17 RX ORDER — VALACYCLOVIR HYDROCHLORIDE 500 MG/1
TABLET, FILM COATED ORAL
Qty: 30 TABLET | Refills: 5 | Status: SHIPPED | OUTPATIENT
Start: 2023-07-17

## 2023-07-27 ENCOUNTER — OFFICE VISIT (OUTPATIENT)
Dept: PRIMARY CARE CLINIC | Age: 48
End: 2023-07-27
Payer: COMMERCIAL

## 2023-07-27 VITALS — TEMPERATURE: 97.3 F | SYSTOLIC BLOOD PRESSURE: 122 MMHG | HEART RATE: 77 BPM | DIASTOLIC BLOOD PRESSURE: 79 MMHG

## 2023-07-27 DIAGNOSIS — H10.33 ACUTE BACTERIAL CONJUNCTIVITIS OF BOTH EYES: Primary | ICD-10-CM

## 2023-07-27 PROCEDURE — G8427 DOCREV CUR MEDS BY ELIG CLIN: HCPCS | Performed by: NURSE PRACTITIONER

## 2023-07-27 PROCEDURE — 1036F TOBACCO NON-USER: CPT | Performed by: NURSE PRACTITIONER

## 2023-07-27 PROCEDURE — 99213 OFFICE O/P EST LOW 20 MIN: CPT | Performed by: NURSE PRACTITIONER

## 2023-07-27 PROCEDURE — G8417 CALC BMI ABV UP PARAM F/U: HCPCS | Performed by: NURSE PRACTITIONER

## 2023-07-27 RX ORDER — OFLOXACIN 3 MG/ML
1 SOLUTION/ DROPS OPHTHALMIC 4 TIMES DAILY
Qty: 5 ML | Refills: 0 | Status: SHIPPED | OUTPATIENT
Start: 2023-07-27 | End: 2023-08-06

## 2023-07-27 RX ORDER — KETOROLAC TROMETHAMINE 5 MG/ML
1 SOLUTION OPHTHALMIC 4 TIMES DAILY PRN
Qty: 3 ML | Refills: 0 | Status: SHIPPED | OUTPATIENT
Start: 2023-07-27 | End: 2023-08-03

## 2023-07-27 NOTE — PROGRESS NOTES
Chief Complaint:   Conjunctivitis (Brown crusted drainage in mornings, already treated for pink eye)      History of Present Illness   Source of history provided by:  patient. Argelia Bills is a 52 y.o. old female with a past medical history of:   Past Medical History:   Diagnosis Date    Acne     Anxiety     Dyslipidemia     Hypothyroidism     takes levothyroxin    Insomnia     Low back pain, episodic       Conjunctivitis  Patient presents for evaluation of discharge, erythema, tearing, and irritation  in both eyes. She has noticed the above symptoms for several days. Onset was acute. Patient denies blurred vision, photophobia, and visual field deficit. There is a history of wearing glasses. Pt was prescribed Polytrim drops w/little improvement. Pt stated she continues to have crusting in the mornings. Pt is afebrile. ROS    Unless otherwise stated in this report or unable to obtain because of the patient's clinical or mental status as evidenced by the medical record, this patients's positive and negative responses for Review of Systems, constitutional, psych, eyes, ENT, cardiovascular, respiratory, gastrointestinal, neurological, genitourinary, musculoskeletal, integument systems and systems related to the presenting problem are either stated in the preceding or were not pertinent or were negative for the symptoms and/or complaints related to the medical problem. Past Surgical History:  has a past surgical history that includes  section (2008);  section (2012); and Tubal ligation (2012). Social History:  reports that she quit smoking about 13 years ago. Her smoking use included cigarettes. She has a 1.25 pack-year smoking history. She has never used smokeless tobacco. She reports current alcohol use. She reports that she does not use drugs. Family History: family history includes Lung Cancer in her paternal grandmother;  Other in her maternal grandmother and

## 2023-08-31 DIAGNOSIS — G43.839 INTRACTABLE MENSTRUAL MIGRAINE WITHOUT STATUS MIGRAINOSUS: ICD-10-CM

## 2023-09-07 DIAGNOSIS — E03.9 ACQUIRED HYPOTHYROIDISM: ICD-10-CM

## 2023-09-07 RX ORDER — LEVOTHYROXINE SODIUM 0.05 MG/1
TABLET ORAL
Qty: 90 TABLET | Refills: 1 | OUTPATIENT
Start: 2023-09-07

## 2023-09-13 DIAGNOSIS — B02.29 POST HERPETIC NEURALGIA: ICD-10-CM

## 2023-09-13 RX ORDER — GABAPENTIN 300 MG/1
CAPSULE ORAL
Qty: 90 CAPSULE | Refills: 2 | OUTPATIENT
Start: 2023-09-13

## 2023-10-11 DIAGNOSIS — B02.29 POST HERPETIC NEURALGIA: ICD-10-CM

## 2023-10-12 RX ORDER — GABAPENTIN 300 MG/1
CAPSULE ORAL
Qty: 90 CAPSULE | Refills: 2 | Status: SHIPPED | OUTPATIENT
Start: 2023-10-12 | End: 2024-04-12

## 2023-10-18 ENCOUNTER — PATIENT MESSAGE (OUTPATIENT)
Dept: FAMILY MEDICINE CLINIC | Age: 48
End: 2023-10-18

## 2023-10-18 DIAGNOSIS — Z12.31 SCREENING MAMMOGRAM FOR BREAST CANCER: Primary | ICD-10-CM

## 2023-10-25 NOTE — TELEPHONE ENCOUNTER
From: Hannah Amador  Sent: 10/24/2023 8:56 PM EDT  To: [facility] Clinical Staff  Subject: Mammogram prescription    ? ??

## 2023-10-26 NOTE — TELEPHONE ENCOUNTER
1. Screening mammogram for breast cancer  -     [facility] DIGITAL SCREEN BILATERAL PER PROTOCOL;  Future

## 2023-11-06 ENCOUNTER — HOSPITAL ENCOUNTER (OUTPATIENT)
Dept: GENERAL RADIOLOGY | Age: 48
Discharge: HOME | End: 2023-11-08
Payer: COMMERCIAL

## 2023-11-06 VITALS — WEIGHT: 160 LBS | HEIGHT: 64 IN | BODY MASS INDEX: 27.31 KG/M2

## 2023-11-06 DIAGNOSIS — Z12.31 SCREENING MAMMOGRAM FOR BREAST CANCER: ICD-10-CM

## 2023-11-06 PROCEDURE — 77067 SCR MAMMO BI INCL CAD: CPT

## 2023-11-06 PROCEDURE — 77063 BREAST TOMOSYNTHESIS BI: CPT

## 2023-11-17 ENCOUNTER — PATIENT MESSAGE (OUTPATIENT)
Dept: FAMILY MEDICINE CLINIC | Age: 48
End: 2023-11-17

## 2023-11-20 NOTE — TELEPHONE ENCOUNTER
From: Fab Wall  To: Dr. Bianac Hairston  Sent: 11/17/2023 7:48 PM EST  Subject: Change pharmacy     I am going to change our pharmacy for to [address] [facility]  [address], [facility]

## 2024-01-03 DIAGNOSIS — E03.9 ACQUIRED HYPOTHYROIDISM: ICD-10-CM

## 2024-01-03 DIAGNOSIS — B00.9 HERPES: ICD-10-CM

## 2024-01-03 DIAGNOSIS — B02.29 POST HERPETIC NEURALGIA: ICD-10-CM

## 2024-01-03 RX ORDER — GABAPENTIN 300 MG/1
CAPSULE ORAL
Qty: 90 CAPSULE | Refills: 2 | OUTPATIENT
Start: 2024-01-03 | End: 2024-07-04

## 2024-01-04 RX ORDER — VALACYCLOVIR HYDROCHLORIDE 500 MG/1
500 TABLET, FILM COATED ORAL DAILY
Qty: 30 TABLET | Refills: 5 | Status: SHIPPED | OUTPATIENT
Start: 2024-01-04

## 2024-01-04 RX ORDER — LEVOTHYROXINE SODIUM 0.05 MG/1
TABLET ORAL
Qty: 90 TABLET | Refills: 1 | Status: SHIPPED | OUTPATIENT
Start: 2024-01-04

## 2024-01-22 DIAGNOSIS — B02.29 POST HERPETIC NEURALGIA: ICD-10-CM

## 2024-01-22 RX ORDER — GABAPENTIN 300 MG/1
CAPSULE ORAL
Qty: 90 CAPSULE | Refills: 0 | Status: SHIPPED | OUTPATIENT
Start: 2024-01-22 | End: 2024-07-23

## 2024-01-23 NOTE — TELEPHONE ENCOUNTER
1. Post herpetic neuralgia  -     gabapentin (NEURONTIN) 300 MG capsule; take 1 capsule by mouth three times a day., Disp-90 capsule, R-0Normal

## 2024-01-28 SDOH — ECONOMIC STABILITY: FOOD INSECURITY: WITHIN THE PAST 12 MONTHS, YOU WORRIED THAT YOUR FOOD WOULD RUN OUT BEFORE YOU GOT MONEY TO BUY MORE.: NEVER TRUE

## 2024-01-28 SDOH — ECONOMIC STABILITY: FOOD INSECURITY: WITHIN THE PAST 12 MONTHS, THE FOOD YOU BOUGHT JUST DIDN'T LAST AND YOU DIDN'T HAVE MONEY TO GET MORE.: NEVER TRUE

## 2024-01-28 SDOH — ECONOMIC STABILITY: INCOME INSECURITY: HOW HARD IS IT FOR YOU TO PAY FOR THE VERY BASICS LIKE FOOD, HOUSING, MEDICAL CARE, AND HEATING?: NOT VERY HARD

## 2024-01-29 ENCOUNTER — PATIENT MESSAGE (OUTPATIENT)
Dept: FAMILY MEDICINE CLINIC | Age: 49
End: 2024-01-29

## 2024-01-29 ENCOUNTER — OFFICE VISIT (OUTPATIENT)
Dept: FAMILY MEDICINE CLINIC | Age: 49
End: 2024-01-29
Payer: COMMERCIAL

## 2024-01-29 VITALS
WEIGHT: 176 LBS | HEART RATE: 80 BPM | BODY MASS INDEX: 30.05 KG/M2 | SYSTOLIC BLOOD PRESSURE: 131 MMHG | OXYGEN SATURATION: 98 % | TEMPERATURE: 97.7 F | RESPIRATION RATE: 20 BRPM | HEIGHT: 64 IN | DIASTOLIC BLOOD PRESSURE: 83 MMHG

## 2024-01-29 DIAGNOSIS — E78.5 DYSLIPIDEMIA: ICD-10-CM

## 2024-01-29 DIAGNOSIS — E03.9 ACQUIRED HYPOTHYROIDISM: ICD-10-CM

## 2024-01-29 DIAGNOSIS — E55.9 VITAMIN D DEFICIENCY: ICD-10-CM

## 2024-01-29 DIAGNOSIS — F41.9 ANXIETY: ICD-10-CM

## 2024-01-29 DIAGNOSIS — L70.0 ACNE VULGARIS: Primary | ICD-10-CM

## 2024-01-29 DIAGNOSIS — F33.1 MODERATE EPISODE OF RECURRENT MAJOR DEPRESSIVE DISORDER (HCC): ICD-10-CM

## 2024-01-29 DIAGNOSIS — Z87.891 HISTORY OF TOBACCO USE: ICD-10-CM

## 2024-01-29 DIAGNOSIS — Z23 NEED FOR INFLUENZA VACCINATION: ICD-10-CM

## 2024-01-29 DIAGNOSIS — Z23 NEED FOR TDAP VACCINATION: ICD-10-CM

## 2024-01-29 DIAGNOSIS — B02.29 POST HERPETIC NEURALGIA: ICD-10-CM

## 2024-01-29 DIAGNOSIS — Z71.89 ACP (ADVANCE CARE PLANNING): ICD-10-CM

## 2024-01-29 DIAGNOSIS — F51.01 PRIMARY INSOMNIA: ICD-10-CM

## 2024-01-29 DIAGNOSIS — Z00.00 ENCOUNTER FOR WELL ADULT EXAM WITHOUT ABNORMAL FINDINGS: Primary | ICD-10-CM

## 2024-01-29 LAB
ABSOLUTE IMMATURE GRANULOCYTE: <0.03 K/UL (ref 0–0.58)
ALBUMIN SERPL-MCNC: 4.4 G/DL (ref 3.5–5.2)
ALP BLD-CCNC: 61 U/L (ref 35–104)
ALT SERPL-CCNC: 15 U/L (ref 0–32)
ANION GAP SERPL CALCULATED.3IONS-SCNC: 11 MMOL/L (ref 7–16)
AST SERPL-CCNC: 20 U/L (ref 0–31)
BASOPHILS ABSOLUTE: 0.05 K/UL (ref 0–0.2)
BASOPHILS RELATIVE PERCENT: 1 % (ref 0–2)
BILIRUB SERPL-MCNC: 0.6 MG/DL (ref 0–1.2)
BUN BLDV-MCNC: 12 MG/DL (ref 6–20)
CALCIUM SERPL-MCNC: 9 MG/DL (ref 8.6–10.2)
CHLORIDE BLD-SCNC: 103 MMOL/L (ref 98–107)
CHOLESTEROL: 248 MG/DL
CO2: 23 MMOL/L (ref 22–29)
CREAT SERPL-MCNC: 0.7 MG/DL (ref 0.5–1)
EOSINOPHILS ABSOLUTE: 0.24 K/UL (ref 0.05–0.5)
EOSINOPHILS RELATIVE PERCENT: 3 % (ref 0–6)
GFR SERPL CREATININE-BSD FRML MDRD: >60 ML/MIN/1.73M2
GLUCOSE BLD-MCNC: 102 MG/DL (ref 74–99)
HCT VFR BLD CALC: 41.5 % (ref 34–48)
HDLC SERPL-MCNC: 60 MG/DL
HEMOGLOBIN: 14.1 G/DL (ref 11.5–15.5)
IMMATURE GRANULOCYTES: 0 % (ref 0–5)
LDL CHOLESTEROL: 162 MG/DL
LYMPHOCYTES ABSOLUTE: 2.28 K/UL (ref 1.5–4)
LYMPHOCYTES RELATIVE PERCENT: 30 % (ref 20–42)
MCH RBC QN AUTO: 33.7 PG (ref 26–35)
MCHC RBC AUTO-ENTMCNC: 34 G/DL (ref 32–34.5)
MCV RBC AUTO: 99.3 FL (ref 80–99.9)
MONOCYTES ABSOLUTE: 0.57 K/UL (ref 0.1–0.95)
MONOCYTES RELATIVE PERCENT: 7 % (ref 2–12)
NEUTROPHILS ABSOLUTE: 4.58 K/UL (ref 1.8–7.3)
NEUTROPHILS RELATIVE PERCENT: 59 % (ref 43–80)
PDW BLD-RTO: 12.5 % (ref 11.5–15)
PLATELET # BLD: 238 K/UL (ref 130–450)
PMV BLD AUTO: 10.4 FL (ref 7–12)
POTASSIUM SERPL-SCNC: 4.5 MMOL/L (ref 3.5–5)
RBC # BLD: 4.18 M/UL (ref 3.5–5.5)
SODIUM BLD-SCNC: 137 MMOL/L (ref 132–146)
TOTAL PROTEIN: 7.3 G/DL (ref 6.4–8.3)
TRIGL SERPL-MCNC: 129 MG/DL
TSH SERPL DL<=0.05 MIU/L-ACNC: 1.64 UIU/ML (ref 0.27–4.2)
VITAMIN D 25-HYDROXY: 25.7 NG/ML (ref 30–100)
VLDLC SERPL CALC-MCNC: 26 MG/DL
WBC # BLD: 7.7 K/UL (ref 4.5–11.5)

## 2024-01-29 PROCEDURE — 90471 IMMUNIZATION ADMIN: CPT | Performed by: STUDENT IN AN ORGANIZED HEALTH CARE EDUCATION/TRAINING PROGRAM

## 2024-01-29 PROCEDURE — 99396 PREV VISIT EST AGE 40-64: CPT | Performed by: STUDENT IN AN ORGANIZED HEALTH CARE EDUCATION/TRAINING PROGRAM

## 2024-01-29 PROCEDURE — 90715 TDAP VACCINE 7 YRS/> IM: CPT | Performed by: STUDENT IN AN ORGANIZED HEALTH CARE EDUCATION/TRAINING PROGRAM

## 2024-01-29 PROCEDURE — G8482 FLU IMMUNIZE ORDER/ADMIN: HCPCS | Performed by: STUDENT IN AN ORGANIZED HEALTH CARE EDUCATION/TRAINING PROGRAM

## 2024-01-29 PROCEDURE — 90472 IMMUNIZATION ADMIN EACH ADD: CPT | Performed by: STUDENT IN AN ORGANIZED HEALTH CARE EDUCATION/TRAINING PROGRAM

## 2024-01-29 PROCEDURE — 90674 CCIIV4 VAC NO PRSV 0.5 ML IM: CPT | Performed by: STUDENT IN AN ORGANIZED HEALTH CARE EDUCATION/TRAINING PROGRAM

## 2024-01-29 RX ORDER — GABAPENTIN 300 MG/1
300 CAPSULE ORAL 2 TIMES DAILY
Qty: 180 CAPSULE | Refills: 0
Start: 2024-01-29 | End: 2024-04-28

## 2024-01-29 ASSESSMENT — PATIENT HEALTH QUESTIONNAIRE - PHQ9
9. THOUGHTS THAT YOU WOULD BE BETTER OFF DEAD, OR OF HURTING YOURSELF: SEVERAL DAYS
SUM OF ALL RESPONSES TO PHQ QUESTIONS 1-9: 11
4. FEELING TIRED OR HAVING LITTLE ENERGY: MORE THAN HALF THE DAYS
8. MOVING OR SPEAKING SO SLOWLY THAT OTHER PEOPLE COULD HAVE NOTICED. OR THE OPPOSITE, BEING SO FIGETY OR RESTLESS THAT YOU HAVE BEEN MOVING AROUND A LOT MORE THAN USUAL: 0
SUM OF ALL RESPONSES TO PHQ QUESTIONS 1-9: 11
9. THOUGHTS THAT YOU WOULD BE BETTER OFF DEAD, OR OF HURTING YOURSELF: 1
6. FEELING BAD ABOUT YOURSELF - OR THAT YOU ARE A FAILURE OR HAVE LET YOURSELF OR YOUR FAMILY DOWN: NEARLY EVERY DAY
8. MOVING OR SPEAKING SO SLOWLY THAT OTHER PEOPLE COULD HAVE NOTICED. OR THE OPPOSITE - BEING SO FIDGETY OR RESTLESS THAT YOU HAVE BEEN MOVING AROUND A LOT MORE THAN USUAL: NOT AT ALL
1. LITTLE INTEREST OR PLEASURE IN DOING THINGS: MORE THAN HALF THE DAYS
7. TROUBLE CONCENTRATING ON THINGS, SUCH AS READING THE NEWSPAPER OR WATCHING TELEVISION: 1
1. LITTLE INTEREST OR PLEASURE IN DOING THINGS: 2
SUM OF ALL RESPONSES TO PHQ QUESTIONS 1-9: 11
5. POOR APPETITE OR OVEREATING: 0
10. IF YOU CHECKED OFF ANY PROBLEMS, HOW DIFFICULT HAVE THESE PROBLEMS MADE IT FOR YOU TO DO YOUR WORK, TAKE CARE OF THINGS AT HOME, OR GET ALONG WITH OTHER PEOPLE: 1
4. FEELING TIRED OR HAVING LITTLE ENERGY: 2
7. TROUBLE CONCENTRATING ON THINGS, SUCH AS READING THE NEWSPAPER OR WATCHING TELEVISION: SEVERAL DAYS
2. FEELING DOWN, DEPRESSED OR HOPELESS: 2
3. TROUBLE FALLING OR STAYING ASLEEP: NOT AT ALL
10. IF YOU CHECKED OFF ANY PROBLEMS, HOW DIFFICULT HAVE THESE PROBLEMS MADE IT FOR YOU TO DO YOUR WORK, TAKE CARE OF THINGS AT HOME, OR GET ALONG WITH OTHER PEOPLE: SOMEWHAT DIFFICULT
6. FEELING BAD ABOUT YOURSELF - OR THAT YOU ARE A FAILURE OR HAVE LET YOURSELF OR YOUR FAMILY DOWN: 3
2. FEELING DOWN, DEPRESSED OR HOPELESS: MORE THAN HALF THE DAYS
3. TROUBLE FALLING OR STAYING ASLEEP: 0
SUM OF ALL RESPONSES TO PHQ QUESTIONS 1-9: 11
SUM OF ALL RESPONSES TO PHQ9 QUESTIONS 1 & 2: 4
5. POOR APPETITE OR OVEREATING: NOT AT ALL
SUM OF ALL RESPONSES TO PHQ QUESTIONS 1-9: 10
SUM OF ALL RESPONSES TO PHQ9 QUESTIONS 1 & 2: 4

## 2024-01-29 ASSESSMENT — ENCOUNTER SYMPTOMS
SHORTNESS OF BREATH: 0
WHEEZING: 0
COUGH: 0
ABDOMINAL DISTENTION: 0
ABDOMINAL PAIN: 0

## 2024-01-29 ASSESSMENT — COLUMBIA-SUICIDE SEVERITY RATING SCALE - C-SSRS
2. IN THE PAST MONTH, HAVE YOU ACTUALLY HAD ANY THOUGHTS OF KILLING YOURSELF?: NO
6. IN YOUR LIFETIME, HAVE YOU EVER DONE ANYTHING, STARTED TO DO ANYTHING, OR PREPARED TO DO ANYTHING TO END YOUR LIFE?: NO
1. IN THE PAST MONTH, HAVE YOU WISHED YOU WERE DEAD OR WISHED YOU COULD GO TO SLEEP AND NOT WAKE UP?: YES
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
## 2024-01-29 NOTE — PATIENT INSTRUCTIONS
health.  Get family and friends involved to provide support. Talk to them about why you are trying to lose weight, and ask them to help. They can help by participating in exercise and having meals with you, even if they may be eating something different.  Find what works best for you. If you do not have time or do not like to cook, a program that offers meal replacement bars or shakes may be better for you. Or if you like to prepare meals, finding a plan that includes daily menus and recipes may be best.  Ask your doctor about other health professionals who can help you achieve your weight loss goals.  A dietitian can help you make healthy changes in your diet.  An exercise specialist or  can help you develop a safe and effective exercise program.  A counselor or psychiatrist can help you cope with issues such as depression, anxiety, or family problems that can make it hard to focus on weight loss.  Consider joining a support group for people who are trying to lose weight. Your doctor can suggest groups in your area.  Where can you learn more?  Go to https://www.[employer].net/patientEd and enter U357 to learn more about \"Starting a Weight Loss Plan: Care Instructions.\"  Current as of: September 20, 2023               Content Version: 13.9  © 0367-0738 [employer].   Care instructions adapted under license by [facility]. If you have questions about a medical condition or this instruction, always ask your healthcare professional. [employer] disclaims any warranty or liability for your use of this information.           Well Visit, Ages 18 to 65: Care Instructions  Well visits can help you stay healthy. Your doctor has checked your overall health and may have suggested ways to take good care of yourself. Your doctor also may have recommended tests. You can help prevent illness with healthy eating, good sleep, vaccinations, regular exercise, and other steps.    Get the tests

## 2024-01-30 NOTE — TELEPHONE ENCOUNTER
From: Shannen Eaton  Sent: 1/29/2024 6:19 PM EST  To: Usha Joy  Clinical Staff  Subject: Labs    I will although I did just get back yesterday from a 7 day cruise. I definitely need to start eating better.     Thank you for taking the time with me today! I am so glad I switched to you as my doctor!    One thing that I mentioned to the nurse and we forgot to talk about is I still have a lot of acne breakouts and I used to use retina which always seem to help but I am out. Can you call in a prescription for that?

## 2024-01-31 RX ORDER — TRETINOIN 0.5 MG/G
CREAM TOPICAL
Qty: 45 G | Refills: 1 | Status: SHIPPED | OUTPATIENT
Start: 2024-01-31 | End: 2024-03-01

## 2024-01-31 NOTE — TELEPHONE ENCOUNTER
1. Acne vulgaris  -     tretinoin (RETIN-A) 0.05 % cream; Apply topically nightly., Disp-45 g, R-1, Normal    \

## 2024-02-23 DIAGNOSIS — L70.0 ACNE VULGARIS: ICD-10-CM

## 2024-02-23 DIAGNOSIS — B02.29 POST HERPETIC NEURALGIA: ICD-10-CM

## 2024-02-26 RX ORDER — TRETINOIN 0.5 MG/G
CREAM TOPICAL
Qty: 45 G | Refills: 1 | Status: SHIPPED | OUTPATIENT
Start: 2024-02-26 | End: 2024-03-24

## 2024-02-26 RX ORDER — GABAPENTIN 300 MG/1
300 CAPSULE ORAL 2 TIMES DAILY
Qty: 180 CAPSULE | Refills: 0 | Status: SHIPPED
Start: 2024-02-26 | End: 2024-02-29 | Stop reason: SDUPTHER

## 2024-02-28 ENCOUNTER — TELEPHONE (OUTPATIENT)
Dept: FAMILY MEDICINE CLINIC | Age: 49
End: 2024-02-28

## 2024-02-28 DIAGNOSIS — B02.29 POST HERPETIC NEURALGIA: ICD-10-CM

## 2024-02-29 RX ORDER — GABAPENTIN 300 MG/1
300 CAPSULE ORAL 3 TIMES DAILY
Qty: 90 CAPSULE | Refills: 2 | Status: SHIPPED | OUTPATIENT
Start: 2024-02-29 | End: 2024-05-29

## 2024-02-29 NOTE — TELEPHONE ENCOUNTER
1. Post herpetic neuralgia  -     gabapentin (NEURONTIN) 300 MG capsule; Take 1 capsule by mouth 3 times daily for 90 days. take 1 capsule by mouth three times a day., Disp-90 capsule, R-2Normal

## 2024-04-10 DIAGNOSIS — B00.9 HERPES: ICD-10-CM

## 2024-04-10 RX ORDER — VALACYCLOVIR HYDROCHLORIDE 500 MG/1
500 TABLET, FILM COATED ORAL DAILY
Qty: 30 TABLET | Refills: 5 | Status: SHIPPED | OUTPATIENT
Start: 2024-04-10

## 2024-05-13 DIAGNOSIS — G43.839 INTRACTABLE MENSTRUAL MIGRAINE WITHOUT STATUS MIGRAINOSUS: ICD-10-CM

## 2024-05-14 ENCOUNTER — TELEPHONE (OUTPATIENT)
Dept: OBGYN | Age: 49
End: 2024-05-14

## 2024-05-14 DIAGNOSIS — G43.839 INTRACTABLE MENSTRUAL MIGRAINE WITHOUT STATUS MIGRAINOSUS: ICD-10-CM

## 2024-05-14 NOTE — TELEPHONE ENCOUNTER
----- Message from Ellie Newman DO sent at 5/14/2024 11:57 AM EDT -----  Regarding: RE: Refill  Contact: [phone]  Rx sent  ----- Message -----  From: Tanya Gonzalez LPN  Sent: 5/14/2024   9:27 AM EDT  To: Ellie Newman DO  Subject: FW: Refill                                         ----- Message -----  From: Shannen Eaton  Sent: 5/14/2024   9:26 AM EDT  To: [facility] Clinical Staff  Subject: Refill                                           I saw you in March and you prescribed the estrogen patches, they were extremely helpful, but I need a refill. I am going next month for my annual, I can ask her for more refills if needed, but I need one refill to get me to that appointment.

## 2024-06-10 ENCOUNTER — OFFICE VISIT (OUTPATIENT)
Dept: OBGYN | Age: 49
End: 2024-06-10
Payer: COMMERCIAL

## 2024-06-10 VITALS
DIASTOLIC BLOOD PRESSURE: 81 MMHG | BODY MASS INDEX: 30.19 KG/M2 | SYSTOLIC BLOOD PRESSURE: 127 MMHG | WEIGHT: 176.8 LBS | HEART RATE: 71 BPM | HEIGHT: 64 IN

## 2024-06-10 DIAGNOSIS — Z12.4 SCREENING FOR CERVICAL CANCER: Primary | ICD-10-CM

## 2024-06-10 PROCEDURE — 99213 OFFICE O/P EST LOW 20 MIN: CPT | Performed by: MIDWIFE

## 2024-06-10 PROCEDURE — G8427 DOCREV CUR MEDS BY ELIG CLIN: HCPCS | Performed by: MIDWIFE

## 2024-06-10 PROCEDURE — 99396 PREV VISIT EST AGE 40-64: CPT | Performed by: MIDWIFE

## 2024-06-10 PROCEDURE — G8417 CALC BMI ABV UP PARAM F/U: HCPCS | Performed by: MIDWIFE

## 2024-06-10 PROCEDURE — 1036F TOBACCO NON-USER: CPT | Performed by: MIDWIFE

## 2024-06-10 RX ORDER — ZALEPLON 5 MG/1
CAPSULE ORAL
COMMUNITY
Start: 2024-06-07

## 2024-06-10 NOTE — PROGRESS NOTES
Patient alert and pleasant with no complaints  Here today for annual GYN visit.  Pelvic exam completed, pap smear obtained, labeled and sent to lab. Clinical breast exam performed.  Discharge instructions have been discussed with the patient. Patient advised to call our office with any questions or concerns.   Voiced understanding.

## 2024-06-10 NOTE — PROGRESS NOTES
Shannen Eaton    Chief Complaint   Patient presents with    Annual Exam     Patient here for annual exam, with  c/o sore nipples.   Last pap on file was 5/15/19.   Mammogram up to date as of 2023.         HPI  Cycles 28 days now with patch  Patch helpful with headaches      Menses:   regular  Gyn symptoms:  none  Sexual activity:  with male partner  Menopausal symptoms:  menopausal symptoms: hot flashes  Breast symptoms:   none  Urinary symptoms:  none    Last PAP:  was normal in 2019  Last mammogram:  approximate date 10/2022 and was normal      Past Medical History:   Diagnosis Date    Acne     Anxiety     Depression     All my life    Dyslipidemia     Hypothyroidism     takes levothyroxin    Insomnia     Low back pain, episodic       Past Surgical History:   Procedure Laterality Date     SECTION  2008     SECTION  2012    TUBAL LIGATION  2012      OB History          2    Para   2    Term   2            AB        Living   3         SAB        IAB        Ectopic        Molar        Multiple   1    Live Births   3               Current Outpatient Medications   Medication Sig Dispense Refill    zaleplon (SONATA) 5 MG capsule TAKE 1 CAPSULE BY MOUTH EVERY DAY AT BEDTIME      estradiol (CLIMARA) 0.1 MG/24HR place 1 patch to CLEAN, DRY, AND INTACT SKIN every 7 days 2 DAYS PRIOR TO START OF MENSES AND LEAVE IN PLACE FOR 7 DAYS 12 patch 3    valACYclovir (VALTREX) 500 MG tablet Take 1 tablet by mouth daily 30 tablet 5    scopolamine (TRANSDERM-SCOP, 1.5 MG,) transdermal patch Place 1 patch onto the skin every 72 hours 5 patch 1    Red Yeast Rice 600 MG CAPS Take by mouth      ibuprofen (ADVIL;MOTRIN) 600 MG tablet Take 1 tablet by mouth every 8 hours as needed for Pain 30 tablet 5    aluminum chloride (DRYSOL) 20 % external solution Apply topically nightly. 60 mL 1    triamcinolone (KENALOG) 0.1 % cream apply to affected area qd prn 45 g 1    TRINTELLIX 20 MG TABS tablet

## 2024-06-13 LAB
CHLAMYDIA BY THIN PREP: NEGATIVE
N. GONORRHOEAE DNA, THIN PREP: NEGATIVE

## 2024-06-14 LAB
HPV SAMPLE: NORMAL
HPV SOURCE: NORMAL
HPV, GENOTYPE 16: NOT DETECTED
HPV, GENOTYPE 18: NOT DETECTED
HPV, HIGH RISK OTHER: NOT DETECTED
HPV, INTERPRETATION: NORMAL

## 2024-06-17 LAB — GYNECOLOGY CYTOLOGY REPORT: NORMAL

## 2024-07-05 DIAGNOSIS — E03.9 ACQUIRED HYPOTHYROIDISM: ICD-10-CM

## 2024-07-05 DIAGNOSIS — B02.29 POST HERPETIC NEURALGIA: ICD-10-CM

## 2024-07-05 RX ORDER — GABAPENTIN 300 MG/1
300 CAPSULE ORAL 3 TIMES DAILY
Qty: 90 CAPSULE | Refills: 2 | Status: SHIPPED | OUTPATIENT
Start: 2024-07-05 | End: 2024-10-03

## 2024-07-05 RX ORDER — LEVOTHYROXINE SODIUM 0.05 MG/1
TABLET ORAL
Qty: 90 TABLET | Refills: 1 | Status: SHIPPED | OUTPATIENT
Start: 2024-07-05

## 2024-08-12 ASSESSMENT — ENCOUNTER SYMPTOMS
RESPIRATORY NEGATIVE: 1
ALLERGIC/IMMUNOLOGIC NEGATIVE: 1
EYES NEGATIVE: 1
GASTROINTESTINAL NEGATIVE: 1

## 2024-08-16 ENCOUNTER — TELEPHONE (OUTPATIENT)
Dept: OBGYN CLINIC | Age: 49
End: 2024-08-16

## 2024-08-16 ENCOUNTER — TELEPHONE (OUTPATIENT)
Dept: OBGYN | Age: 49
End: 2024-08-16

## 2024-08-16 DIAGNOSIS — N85.00 ENDOMETRIAL HYPERPLASIA: Primary | ICD-10-CM

## 2024-08-16 NOTE — TELEPHONE ENCOUNTER
Notified patient that an order was placed for her to get a pelvic ultrasound and per Anum, no need to start progesterone right now she may continue with her Climara patch.

## 2024-08-16 NOTE — TELEPHONE ENCOUNTER
She is only using climara patch for 1 week a month.  Needs US to check uterine lining.  Order placed

## 2024-09-04 ENCOUNTER — HOSPITAL ENCOUNTER (OUTPATIENT)
Dept: ULTRASOUND IMAGING | Age: 49
Discharge: HOME | End: 2024-09-06
Payer: COMMERCIAL

## 2024-09-04 DIAGNOSIS — N85.00 ENDOMETRIAL HYPERPLASIA: ICD-10-CM

## 2024-09-04 PROCEDURE — 76856 US EXAM PELVIC COMPLETE: CPT

## 2024-09-06 ENCOUNTER — PATIENT MESSAGE (OUTPATIENT)
Dept: FAMILY MEDICINE CLINIC | Age: 49
End: 2024-09-06

## 2024-09-06 DIAGNOSIS — B00.9 HERPES: ICD-10-CM

## 2024-09-08 RX ORDER — VALACYCLOVIR HYDROCHLORIDE 500 MG/1
1000 TABLET, FILM COATED ORAL DAILY
Qty: 180 TABLET | Refills: 3 | Status: SHIPPED | OUTPATIENT
Start: 2024-09-08

## 2024-09-17 PROCEDURE — 88305 TISSUE EXAM BY PATHOLOGIST: CPT | Performed by: DERMATOLOGY

## 2024-09-18 ENCOUNTER — LAB REQUISITION (OUTPATIENT)
Dept: DERMATOPATHOLOGY | Facility: CLINIC | Age: 49
End: 2024-09-18
Payer: COMMERCIAL

## 2024-09-18 DIAGNOSIS — L98.9 DISORDER OF THE SKIN AND SUBCUTANEOUS TISSUE, UNSPECIFIED: ICD-10-CM

## 2024-09-19 LAB
LABORATORY COMMENT REPORT: NORMAL
PATH REPORT.FINAL DX SPEC: NORMAL
PATH REPORT.GROSS SPEC: NORMAL
PATH REPORT.MICROSCOPIC SPEC OTHER STN: NORMAL
PATH REPORT.RELEVANT HX SPEC: NORMAL
PATH REPORT.TOTAL CANCER: NORMAL

## 2024-10-18 ENCOUNTER — NURSE ONLY (OUTPATIENT)
Dept: FAMILY MEDICINE CLINIC | Age: 49
End: 2024-10-18

## 2024-10-18 DIAGNOSIS — Z23 FLU VACCINE NEED: Primary | ICD-10-CM

## 2024-10-18 NOTE — PROGRESS NOTES
Patient here for flu vaccine.  Administered vaccine in the left deltoid with out incident.  Patient stated no allergies to the flu vaccine and she did not have any questions.

## 2024-10-29 PROCEDURE — 88305 TISSUE EXAM BY PATHOLOGIST: CPT | Performed by: DERMATOLOGY

## 2024-10-30 ENCOUNTER — LAB REQUISITION (OUTPATIENT)
Dept: DERMATOPATHOLOGY | Facility: CLINIC | Age: 49
End: 2024-10-30
Payer: COMMERCIAL

## 2024-10-30 DIAGNOSIS — L98.9 DISORDER OF THE SKIN AND SUBCUTANEOUS TISSUE, UNSPECIFIED: ICD-10-CM

## 2024-11-07 ENCOUNTER — HOSPITAL ENCOUNTER (OUTPATIENT)
Dept: GENERAL RADIOLOGY | Age: 49
Discharge: HOME | End: 2024-11-09
Payer: COMMERCIAL

## 2024-11-07 DIAGNOSIS — Z12.31 ENCOUNTER FOR SCREENING MAMMOGRAM FOR MALIGNANT NEOPLASM OF BREAST: ICD-10-CM

## 2024-11-07 PROCEDURE — 77063 BREAST TOMOSYNTHESIS BI: CPT

## 2024-11-11 ENCOUNTER — CLINICAL DOCUMENTATION (OUTPATIENT)
Dept: GENERAL RADIOLOGY | Age: 49
End: 2024-11-11

## 2024-11-11 NOTE — PROGRESS NOTES
Patient viewed clinical report for mammogram on My Chart.  Report provided to Dr. Celeste Rodriguez per patient request on Authorization to Release the results of a mammogram form.  Electronically signed by Marci Escalante RN, BSN on 11/11/2024 at 8:05 AM

## 2024-12-04 DIAGNOSIS — B02.29 POST HERPETIC NEURALGIA: ICD-10-CM

## 2024-12-05 DIAGNOSIS — B02.29 POST HERPETIC NEURALGIA: ICD-10-CM

## 2024-12-05 NOTE — TELEPHONE ENCOUNTER
Name of Medication(s) Requested:  Requested Prescriptions     Pending Prescriptions Disp Refills    gabapentin (NEURONTIN) 300 MG capsule 90 capsule 2     Sig: Take 1 capsule by mouth 3 times daily for 90 days. take 1 capsule by mouth three times a day.       Medication is on current medication list Yes    Dosage and directions were verified? Yes    Quantity verified: 90 day supply     Pharmacy Verified?  Yes    Last Appointment:  1/29/2024    Future appts:  Future Appointments   Date Time Provider Department Center   1/30/2025 10:00 AM Celeste Rodriguez MD [place]   6/16/2025  1:00 PM Anum Woods, APRN - CNM [facility]        (If no appt send self scheduling link. .REFILLAPPT)  Scheduling request sent?     [] Yes  [x] No    Does patient need updated?  [] Yes  [x] No

## 2024-12-05 NOTE — TELEPHONE ENCOUNTER
Name of Medication(s) Requested:  Requested Prescriptions     Pending Prescriptions Disp Refills    gabapentin (NEURONTIN) 300 MG capsule 90 capsule 2     Sig: Take 1 capsule by mouth 3 times daily for 90 days. take 1 capsule by mouth three times a day.       Medication is on current medication list Yes    Dosage and directions were verified? Yes    Quantity verified: 90 day supply     Pharmacy Verified?  Yes    Last Appointment:  1/29/2024    Future appts:  Future Appointments   Date Time Provider Department Center   1/30/2025 10:00 AM Celeste Rodriguez MD [facility]   6/16/2025  1:00 PM Anum Woods, APRN - CNM [facility]        (If no appt send self scheduling link. .REFILLAPPT)  Scheduling request sent?     [] Yes  [x] No    Does patient need updated?  [] Yes  [x] No

## 2024-12-06 RX ORDER — GABAPENTIN 300 MG/1
300 CAPSULE ORAL 3 TIMES DAILY
Qty: 90 CAPSULE | Refills: 2 | OUTPATIENT
Start: 2024-12-06 | End: 2025-03-06

## 2024-12-06 RX ORDER — GABAPENTIN 300 MG/1
300 CAPSULE ORAL 3 TIMES DAILY
Qty: 90 CAPSULE | Refills: 2 | Status: SHIPPED | OUTPATIENT
Start: 2024-12-06 | End: 2025-03-06

## 2024-12-08 NOTE — TELEPHONE ENCOUNTER
From: Jayna Brandon  To: Kip Shay DO  Sent: 6/13/2019 11:23 AM EDT  Subject: Prescription Question    would a different medication have the same side effects? The Paxil made it difficult too. I have felt pmsy. That agitated feeling. I am not even close to that time. If I increased will it help? Will it make the sexual issue worse?    ----- Message -----  From: Kip Shay DO  Sent: 6/13/19, 8:36 AM  To: Jayna Brandon  Subject: RE: Prescription Question    We can either change the medication to another SSRI or decrease the dose the 5 mg. This is one of the side effects of this class of medications.      ----- Message -----   From: Jayna Brandon   Sent: 6/12/2019 5:40 PM EDT   To: Kip Shay DO  Subject: Prescription Question    I am on the higher dosage of lexipro and I don't have the rage but I do have anxiety and frustration. I am also having sexual side effects where orgasm is impossible.  What do you suggest?
See note
[id number]

## 2025-01-27 ASSESSMENT — PATIENT HEALTH QUESTIONNAIRE - PHQ9
1. LITTLE INTEREST OR PLEASURE IN DOING THINGS: SEVERAL DAYS
6. FEELING BAD ABOUT YOURSELF - OR THAT YOU ARE A FAILURE OR HAVE LET YOURSELF OR YOUR FAMILY DOWN: SEVERAL DAYS
4. FEELING TIRED OR HAVING LITTLE ENERGY: SEVERAL DAYS
9. THOUGHTS THAT YOU WOULD BE BETTER OFF DEAD, OR OF HURTING YOURSELF: NOT AT ALL
4. FEELING TIRED OR HAVING LITTLE ENERGY: SEVERAL DAYS
SUM OF ALL RESPONSES TO PHQ QUESTIONS 1-9: 5
10. IF YOU CHECKED OFF ANY PROBLEMS, HOW DIFFICULT HAVE THESE PROBLEMS MADE IT FOR YOU TO DO YOUR WORK, TAKE CARE OF THINGS AT HOME, OR GET ALONG WITH OTHER PEOPLE: SOMEWHAT DIFFICULT
6. FEELING BAD ABOUT YOURSELF - OR THAT YOU ARE A FAILURE OR HAVE LET YOURSELF OR YOUR FAMILY DOWN: SEVERAL DAYS
SUM OF ALL RESPONSES TO PHQ QUESTIONS 1-9: 5
8. MOVING OR SPEAKING SO SLOWLY THAT OTHER PEOPLE COULD HAVE NOTICED. OR THE OPPOSITE, BEING SO FIGETY OR RESTLESS THAT YOU HAVE BEEN MOVING AROUND A LOT MORE THAN USUAL: NOT AT ALL
5. POOR APPETITE OR OVEREATING: NOT AT ALL
9. THOUGHTS THAT YOU WOULD BE BETTER OFF DEAD, OR OF HURTING YOURSELF: NOT AT ALL
1. LITTLE INTEREST OR PLEASURE IN DOING THINGS: SEVERAL DAYS
SUM OF ALL RESPONSES TO PHQ QUESTIONS 1-9: 5
7. TROUBLE CONCENTRATING ON THINGS, SUCH AS READING THE NEWSPAPER OR WATCHING TELEVISION: SEVERAL DAYS
3. TROUBLE FALLING OR STAYING ASLEEP: NOT AT ALL
2. FEELING DOWN, DEPRESSED OR HOPELESS: SEVERAL DAYS
SUM OF ALL RESPONSES TO PHQ QUESTIONS 1-9: 5
10. IF YOU CHECKED OFF ANY PROBLEMS, HOW DIFFICULT HAVE THESE PROBLEMS MADE IT FOR YOU TO DO YOUR WORK, TAKE CARE OF THINGS AT HOME, OR GET ALONG WITH OTHER PEOPLE: SOMEWHAT DIFFICULT
SUM OF ALL RESPONSES TO PHQ9 QUESTIONS 1 & 2: 2
8. MOVING OR SPEAKING SO SLOWLY THAT OTHER PEOPLE COULD HAVE NOTICED. OR THE OPPOSITE - BEING SO FIDGETY OR RESTLESS THAT YOU HAVE BEEN MOVING AROUND A LOT MORE THAN USUAL: NOT AT ALL
3. TROUBLE FALLING OR STAYING ASLEEP: NOT AT ALL
2. FEELING DOWN, DEPRESSED OR HOPELESS: SEVERAL DAYS
SUM OF ALL RESPONSES TO PHQ QUESTIONS 1-9: 5
7. TROUBLE CONCENTRATING ON THINGS, SUCH AS READING THE NEWSPAPER OR WATCHING TELEVISION: SEVERAL DAYS
5. POOR APPETITE OR OVEREATING: NOT AT ALL

## 2025-01-30 ENCOUNTER — OFFICE VISIT (OUTPATIENT)
Dept: FAMILY MEDICINE CLINIC | Age: 50
End: 2025-01-30

## 2025-01-30 VITALS
WEIGHT: 180 LBS | DIASTOLIC BLOOD PRESSURE: 80 MMHG | BODY MASS INDEX: 30.73 KG/M2 | HEART RATE: 88 BPM | OXYGEN SATURATION: 99 % | TEMPERATURE: 97 F | SYSTOLIC BLOOD PRESSURE: 120 MMHG | RESPIRATION RATE: 17 BRPM | HEIGHT: 64 IN

## 2025-01-30 DIAGNOSIS — F51.01 PRIMARY INSOMNIA: ICD-10-CM

## 2025-01-30 DIAGNOSIS — Z87.891 HISTORY OF TOBACCO USE: ICD-10-CM

## 2025-01-30 DIAGNOSIS — Z00.00 ENCOUNTER FOR WELL ADULT EXAM WITHOUT ABNORMAL FINDINGS: Primary | ICD-10-CM

## 2025-01-30 DIAGNOSIS — B00.9 HERPES: ICD-10-CM

## 2025-01-30 DIAGNOSIS — R92.8 ABNORMAL MAMMOGRAM: ICD-10-CM

## 2025-01-30 DIAGNOSIS — F43.9 STRESS AT HOME: ICD-10-CM

## 2025-01-30 DIAGNOSIS — E66.09 CLASS 1 OBESITY DUE TO EXCESS CALORIES WITH SERIOUS COMORBIDITY AND BODY MASS INDEX (BMI) OF 30.0 TO 30.9 IN ADULT: ICD-10-CM

## 2025-01-30 DIAGNOSIS — E03.9 ACQUIRED HYPOTHYROIDISM: ICD-10-CM

## 2025-01-30 DIAGNOSIS — E78.00 PURE HYPERCHOLESTEROLEMIA: ICD-10-CM

## 2025-01-30 DIAGNOSIS — R73.9 HYPERGLYCEMIA: ICD-10-CM

## 2025-01-30 DIAGNOSIS — E66.811 CLASS 1 OBESITY DUE TO EXCESS CALORIES WITH SERIOUS COMORBIDITY AND BODY MASS INDEX (BMI) OF 30.0 TO 30.9 IN ADULT: ICD-10-CM

## 2025-01-30 DIAGNOSIS — B02.29 POST ZOSTER NEURALGIA: ICD-10-CM

## 2025-01-30 DIAGNOSIS — E55.9 VITAMIN D DEFICIENCY: ICD-10-CM

## 2025-01-30 LAB
ALBUMIN: 4.1 G/DL (ref 3.5–5.2)
ALP BLD-CCNC: 73 U/L (ref 35–104)
ALT SERPL-CCNC: 14 U/L (ref 0–32)
ANION GAP SERPL CALCULATED.3IONS-SCNC: 10 MMOL/L (ref 7–16)
AST SERPL-CCNC: 20 U/L (ref 0–31)
BASOPHILS ABSOLUTE: 0.05 K/UL (ref 0–0.2)
BASOPHILS RELATIVE PERCENT: 1 % (ref 0–2)
BILIRUB SERPL-MCNC: 0.4 MG/DL (ref 0–1.2)
BUN BLDV-MCNC: 12 MG/DL (ref 6–20)
CALCIUM SERPL-MCNC: 9.3 MG/DL (ref 8.6–10.2)
CHLORIDE BLD-SCNC: 103 MMOL/L (ref 98–107)
CHOLESTEROL, TOTAL: 236 MG/DL
CO2: 23 MMOL/L (ref 22–29)
CREAT SERPL-MCNC: 0.8 MG/DL (ref 0.5–1)
EOSINOPHILS ABSOLUTE: 0.19 K/UL (ref 0.05–0.5)
EOSINOPHILS RELATIVE PERCENT: 3 % (ref 0–6)
GFR, ESTIMATED: >90 ML/MIN/1.73M2
GLUCOSE BLD-MCNC: 93 MG/DL (ref 74–99)
HBA1C MFR BLD: 5.5 %
HCT VFR BLD CALC: 41.7 % (ref 34–48)
HDLC SERPL-MCNC: 54 MG/DL
HEMOGLOBIN: 14.3 G/DL (ref 11.5–15.5)
IMMATURE GRANULOCYTES %: 0 % (ref 0–5)
IMMATURE GRANULOCYTES ABSOLUTE: <0.03 K/UL (ref 0–0.58)
LDL CHOLESTEROL: 156 MG/DL
LYMPHOCYTES ABSOLUTE: 1.92 K/UL (ref 1.5–4)
LYMPHOCYTES RELATIVE PERCENT: 26 % (ref 20–42)
MCH RBC QN AUTO: 34.8 PG (ref 26–35)
MCHC RBC AUTO-ENTMCNC: 34.3 G/DL (ref 32–34.5)
MCV RBC AUTO: 101.5 FL (ref 80–99.9)
MONOCYTES ABSOLUTE: 0.48 K/UL (ref 0.1–0.95)
MONOCYTES RELATIVE PERCENT: 7 % (ref 2–12)
NEUTROPHILS ABSOLUTE: 4.61 K/UL (ref 1.8–7.3)
NEUTROPHILS RELATIVE PERCENT: 63 % (ref 43–80)
PDW BLD-RTO: 12 % (ref 11.5–15)
PLATELET # BLD: 260 K/UL (ref 130–450)
PMV BLD AUTO: 10.5 FL (ref 7–12)
POTASSIUM SERPL-SCNC: 4.6 MMOL/L (ref 3.5–5)
RBC # BLD: 4.11 M/UL (ref 3.5–5.5)
SODIUM BLD-SCNC: 136 MMOL/L (ref 132–146)
T4 FREE: 1.4 NG/DL (ref 0.9–1.7)
TOTAL PROTEIN: 7.1 G/DL (ref 6.4–8.3)
TRIGL SERPL-MCNC: 128 MG/DL
TSH SERPL DL<=0.05 MIU/L-ACNC: 2.02 UIU/ML (ref 0.27–4.2)
VITAMIN D 25-HYDROXY: 28.1 NG/ML (ref 30–100)
VLDLC SERPL CALC-MCNC: 26 MG/DL
WBC # BLD: 7.3 K/UL (ref 4.5–11.5)

## 2025-01-30 RX ORDER — CHOLECALCIFEROL (VITAMIN D3) 50 MCG
50 TABLET ORAL
COMMUNITY
Start: 2024-01-27

## 2025-01-30 RX ORDER — CLINDAMYCIN AND BENZOYL PEROXIDE 10; 50 MG/G; MG/G
GEL TOPICAL 2 TIMES DAILY
COMMUNITY
Start: 2024-10-29

## 2025-01-30 RX ORDER — CYST/ALA/Q10/PHOS.SER/DHA/BROC 100-20-50
POWDER (GRAM) ORAL
COMMUNITY
Start: 2024-09-09

## 2025-01-30 SDOH — ECONOMIC STABILITY: INCOME INSECURITY: HOW HARD IS IT FOR YOU TO PAY FOR THE VERY BASICS LIKE FOOD, HOUSING, MEDICAL CARE, AND HEATING?: NOT HARD AT ALL

## 2025-01-30 SDOH — ECONOMIC STABILITY: FOOD INSECURITY: WITHIN THE PAST 12 MONTHS, YOU WORRIED THAT YOUR FOOD WOULD RUN OUT BEFORE YOU GOT MONEY TO BUY MORE.: NEVER TRUE

## 2025-01-30 SDOH — ECONOMIC STABILITY: FOOD INSECURITY: WITHIN THE PAST 12 MONTHS, THE FOOD YOU BOUGHT JUST DIDN'T LAST AND YOU DIDN'T HAVE MONEY TO GET MORE.: NEVER TRUE

## 2025-01-30 SDOH — HEALTH STABILITY: MENTAL HEALTH: HOW MANY STANDARD DRINKS CONTAINING ALCOHOL DO YOU HAVE ON A TYPICAL DAY?: 1 OR 2

## 2025-01-30 SDOH — HEALTH STABILITY: MENTAL HEALTH
STRESS IS WHEN SOMEONE FEELS TENSE, NERVOUS, ANXIOUS, OR CAN'T SLEEP AT NIGHT BECAUSE THEIR MIND IS TROUBLED. HOW STRESSED ARE YOU?: RATHER MUCH

## 2025-01-30 SDOH — SOCIAL STABILITY: SOCIAL NETWORK
DO YOU BELONG TO ANY CLUBS OR ORGANIZATIONS SUCH AS CHURCH GROUPS UNIONS, FRATERNAL OR ATHLETIC GROUPS, OR SCHOOL GROUPS?: NO

## 2025-01-30 SDOH — HEALTH STABILITY: MENTAL HEALTH: HOW OFTEN DO YOU HAVE A DRINK CONTAINING ALCOHOL?: 2-4 TIMES A MONTH

## 2025-01-30 SDOH — SOCIAL STABILITY: SOCIAL NETWORK: ARE YOU MARRIED, WIDOWED, DIVORCED, SEPARATED, NEVER MARRIED, OR LIVING WITH A PARTNER?: DIVORCED

## 2025-01-30 SDOH — SOCIAL STABILITY: SOCIAL NETWORK: HOW OFTEN DO YOU ATTENT MEETINGS OF THE CLUB OR ORGANIZATION YOU BELONG TO?: NEVER

## 2025-01-30 SDOH — ECONOMIC STABILITY: INCOME INSECURITY: IN THE LAST 12 MONTHS, WAS THERE A TIME WHEN YOU WERE NOT ABLE TO PAY THE MORTGAGE OR RENT ON TIME?: NO

## 2025-01-30 SDOH — HEALTH STABILITY: PHYSICAL HEALTH: ON AVERAGE, HOW MANY DAYS PER WEEK DO YOU ENGAGE IN MODERATE TO STRENUOUS EXERCISE (LIKE A BRISK WALK)?: 0 DAYS

## 2025-01-30 SDOH — SOCIAL STABILITY: SOCIAL NETWORK: HOW OFTEN DO YOU ATTEND CHURCH OR RELIGIOUS SERVICES?: NEVER

## 2025-01-30 SDOH — SOCIAL STABILITY: SOCIAL NETWORK: IN A TYPICAL WEEK, HOW MANY TIMES DO YOU TALK ON THE PHONE WITH FAMILY, FRIENDS, OR NEIGHBORS?: TWICE A WEEK

## 2025-01-30 SDOH — HEALTH STABILITY: PHYSICAL HEALTH: ON AVERAGE, HOW MANY MINUTES DO YOU ENGAGE IN EXERCISE AT THIS LEVEL?: 0 MIN

## 2025-01-30 SDOH — SOCIAL STABILITY: SOCIAL NETWORK: HOW OFTEN DO YOU GET TOGETHER WITH FRIENDS OR RELATIVES?: NEVER

## 2025-01-30 ASSESSMENT — ENCOUNTER SYMPTOMS
ABDOMINAL DISTENTION: 0
COUGH: 0
SHORTNESS OF BREATH: 0
NAUSEA: 0
WHEEZING: 0
BLOOD IN STOOL: 0
CONSTIPATION: 0
DIARRHEA: 0
ABDOMINAL PAIN: 0
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
## 2025-01-30 NOTE — PATIENT INSTRUCTIONS
80 ounces of water a day   Track calories   Begin exercising    Well Visit, Ages 18 to 65: Care Instructions  Well visits can help you stay healthy. Your doctor has checked your overall health and may have suggested ways to take good care of yourself. Your doctor also may have recommended tests. You can help prevent illness with healthy eating, good sleep, vaccinations, regular exercise, and other steps.    Get the tests that you and your doctor decide on. Depending on your age and risks, examples might include screening for diabetes; hepatitis C; HIV; and cervical, breast, lung, and colon cancer. Screening helps find diseases before any symptoms appear.   Eat healthy foods. Choose fruits, vegetables, whole grains, lean protein, and low-fat dairy foods. Limit saturated fat and reduce salt.     Limit alcohol. Men should have no more than 2 drinks a day. Women should have no more than 1. For some people, no alcohol is the best choice.   Exercise. Get at least 30 minutes of exercise on most days of the week. Walking can be a good choice.     Reach and stay at your healthy weight. This will lower your risk for many health problems.   Take care of your mental health. Try to stay connected with friends, family, and community, and find ways to manage stress.     If you're feeling depressed or hopeless, talk to someone. A counselor can help. If you don't have a counselor, talk to your doctor.   Talk to your doctor if you think you may have a problem with alcohol or drug use. This includes prescription medicines, marijuana, and other drugs.     Avoid tobacco and nicotine: Don't smoke, vape, or chew. If you need help quitting, talk to your doctor.   Practice safer sex. Getting tested, using condoms or dental dams, and limiting sex partners can help prevent STIs.     Use birth control if it's important to you to prevent pregnancy. Talk with your doctor about your choices and what might be best for you.   Prevent problems

## 2025-02-26 ENCOUNTER — PATIENT MESSAGE (OUTPATIENT)
Dept: FAMILY MEDICINE CLINIC | Age: 50
End: 2025-02-26

## 2025-02-26 ENCOUNTER — OFFICE VISIT (OUTPATIENT)
Dept: PRIMARY CARE CLINIC | Age: 50
End: 2025-02-26

## 2025-02-26 VITALS
HEIGHT: 64 IN | SYSTOLIC BLOOD PRESSURE: 122 MMHG | WEIGHT: 179 LBS | BODY MASS INDEX: 30.56 KG/M2 | DIASTOLIC BLOOD PRESSURE: 88 MMHG | TEMPERATURE: 97.9 F | OXYGEN SATURATION: 97 % | HEART RATE: 102 BPM

## 2025-02-26 DIAGNOSIS — R05.9 COUGH IN ADULT PATIENT: ICD-10-CM

## 2025-02-26 DIAGNOSIS — J01.90 ACUTE BACTERIAL SINUSITIS: Primary | ICD-10-CM

## 2025-02-26 DIAGNOSIS — B96.89 ACUTE BACTERIAL SINUSITIS: Primary | ICD-10-CM

## 2025-02-26 LAB
INFLUENZA A ANTIGEN, POC: NEGATIVE
INFLUENZA B ANTIGEN, POC: NEGATIVE
Lab: NORMAL
PERFORMING INSTRUMENT: NORMAL
QC PASS/FAIL: NORMAL
SARS-COV-2, POC: NORMAL

## 2025-02-26 RX ORDER — AZITHROMYCIN 250 MG/1
TABLET, FILM COATED ORAL
Qty: 6 TABLET | Refills: 0 | Status: SHIPPED | OUTPATIENT
Start: 2025-02-26 | End: 2025-03-08

## 2025-02-26 RX ORDER — PREDNISONE 10 MG/1
10 TABLET ORAL 2 TIMES DAILY
Qty: 10 TABLET | Refills: 0 | Status: SHIPPED | OUTPATIENT
Start: 2025-02-26 | End: 2025-03-03

## 2025-02-26 RX ORDER — BROMPHENIRAMINE MALEATE, PSEUDOEPHEDRINE HYDROCHLORIDE, AND DEXTROMETHORPHAN HYDROBROMIDE 2; 30; 10 MG/5ML; MG/5ML; MG/5ML
5 SYRUP ORAL 4 TIMES DAILY PRN
Qty: 118 ML | Refills: 3 | Status: SHIPPED | OUTPATIENT
Start: 2025-02-26

## 2025-02-26 NOTE — PROGRESS NOTES
Chief Complaint:   Sinus Problem (Sinus drainage started yesterday ) and Cough (Non productive cough since yesterday )      History of Present Illness   Source of history provided by:  patient.      Shannen Eaton is a 49 y.o. old female with a past medical history of:   Past Medical History:   Diagnosis Date    Acne     Anxiety     Depression     All my life    Dyslipidemia     Hypothyroidism     takes levothyroxin    Insomnia     Low back pain, episodic         Pt [facility] with a cough/congestion/sinus pressure for the past 1-2 days.  States the cough is non productive.   No fever noted.    Denies any N/V/D, abdominal pain, CP, progressive SOB, dizziness, or lethargy.   Pt is leaving for vacation tomorrow        ROS    Unless otherwise stated in this report or unable to obtain because of the patient's clinical or mental status as evidenced by the medical record, this patients's positive and negative responses for Review of Systems, constitutional, psych, eyes, ENT, cardiovascular, respiratory, gastrointestinal, neurological, genitourinary, musculoskeletal, integument systems and systems related to the presenting problem are either stated in the preceding or were not pertinent or were negative for the symptoms and/or complaints related to the medical problem.    Past Surgical History:  has a past surgical history that includes  section (2008);  section (2012); and Tubal ligation (2012).  Social History:  reports that she quit smoking about 15 years ago. Her smoking use included cigarettes. She started smoking about 20 years ago. She has a 1.3 pack-year smoking history. She has never used smokeless tobacco. She reports current alcohol use. She reports that she does not use drugs.  Family History: family history includes Alcohol Abuse in her mother; Cancer in her maternal grandmother, mother, and paternal grandmother; Depression in her mother; Lung Cancer in her

## 2025-03-12 DIAGNOSIS — E03.9 ACQUIRED HYPOTHYROIDISM: ICD-10-CM

## 2025-03-12 RX ORDER — LEVOTHYROXINE SODIUM 50 UG/1
TABLET ORAL
Qty: 90 TABLET | Refills: 3 | Status: SHIPPED | OUTPATIENT
Start: 2025-03-12

## 2025-03-12 NOTE — TELEPHONE ENCOUNTER
Name of Medication(s) Requested:  Requested Prescriptions     Pending Prescriptions Disp Refills    levothyroxine (SYNTHROID) 50 MCG tablet 90 tablet 3     Sig: take 1 tablet by mouth once daily       Medication is on current medication list Yes    Dosage and directions were verified? Yes    Quantity verified: 90 day supply     Pharmacy Verified?  Yes    Last Appointment:  1/30/2025    Future appts:  Future Appointments   Date Time Provider Department Center   6/16/2025  1:00 PM Anum Woods, APRN - CNM [facility]   2/3/2026 10:00 AM Celeste Rodriguez MD [place] [facility]        (If no appt send self scheduling link. .REFILLAPPT)  Scheduling request sent?     [] Yes  [x] No    Does patient need updated?  [] Yes  [x] No

## 2025-07-07 ENCOUNTER — OFFICE VISIT (OUTPATIENT)
Age: 50
End: 2025-07-07
Payer: COMMERCIAL

## 2025-07-07 VITALS
RESPIRATION RATE: 18 BRPM | WEIGHT: 179.7 LBS | SYSTOLIC BLOOD PRESSURE: 112 MMHG | OXYGEN SATURATION: 97 % | BODY MASS INDEX: 30.68 KG/M2 | HEART RATE: 75 BPM | HEIGHT: 64 IN | TEMPERATURE: 98.4 F | DIASTOLIC BLOOD PRESSURE: 73 MMHG

## 2025-07-07 DIAGNOSIS — N64.4 SORE NIPPLE: ICD-10-CM

## 2025-07-07 DIAGNOSIS — Z01.419 WELL WOMAN EXAM WITH ROUTINE GYNECOLOGICAL EXAM: Primary | ICD-10-CM

## 2025-07-07 PROCEDURE — 99212 OFFICE O/P EST SF 10 MIN: CPT | Performed by: MIDWIFE

## 2025-07-07 RX ORDER — BUSPIRONE HYDROCHLORIDE 5 MG/1
TABLET ORAL
COMMUNITY
Start: 2025-05-14

## 2025-07-07 RX ORDER — NORETHINDRONE ACETATE AND ETHINYL ESTRADIOL 1MG-20(21)
1 KIT ORAL DAILY
Qty: 1 PACKET | Refills: 3 | Status: SHIPPED | OUTPATIENT
Start: 2025-07-07 | End: 2025-07-21
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
## 2025-07-07 NOTE — PROGRESS NOTES
Pt here today for annual exam  LMP 6/14/25  Last pap 6/10/24  Last mammo 11/7/24  Pt would like to discuss estrogen patches for cycles and other options due to possible allergic reaction to patches    Discharge instructions have been discussed with the patient.   Patient advised to call our office with any questions or concerns.   Voiced understanding.

## 2025-07-21 ENCOUNTER — TELEPHONE (OUTPATIENT)
Age: 50
End: 2025-07-21

## 2025-07-21 RX ORDER — NORETHINDRONE ACETATE AND ETHINYL ESTRADIOL 1.5-30(21)
1 KIT ORAL DAILY
Qty: 1 PACKET | Refills: 11 | Status: SHIPPED | OUTPATIENT
Start: 2025-07-21

## 2025-08-02 ASSESSMENT — ENCOUNTER SYMPTOMS
ALLERGIC/IMMUNOLOGIC NEGATIVE: 1
EYES NEGATIVE: 1
GASTROINTESTINAL NEGATIVE: 1
RESPIRATORY NEGATIVE: 1

## 2025-08-03 LAB — FECAL BLOOD IMMUNOCHEMICAL TEST: NORMAL

## 2025-08-25 ENCOUNTER — OFFICE VISIT (OUTPATIENT)
Age: 50
End: 2025-08-25
Payer: COMMERCIAL

## 2025-08-25 VITALS
WEIGHT: 180.5 LBS | DIASTOLIC BLOOD PRESSURE: 82 MMHG | SYSTOLIC BLOOD PRESSURE: 121 MMHG | TEMPERATURE: 97.4 F | BODY MASS INDEX: 30.98 KG/M2 | OXYGEN SATURATION: 96 % | HEART RATE: 71 BPM

## 2025-08-25 DIAGNOSIS — N93.9 ABNORMAL UTERINE BLEEDING (AUB): Primary | ICD-10-CM

## 2025-08-25 PROCEDURE — 99212 OFFICE O/P EST SF 10 MIN: CPT | Performed by: MIDWIFE

## 2025-08-25 RX ORDER — NORGESTIMATE AND ETHINYL ESTRADIOL 0.25-0.035
1 KIT ORAL DAILY
Qty: 1 PACKET | Refills: 3 | Status: SHIPPED | OUTPATIENT
Start: 2025-08-25